# Patient Record
Sex: FEMALE | Race: OTHER | HISPANIC OR LATINO | URBAN - METROPOLITAN AREA
[De-identification: names, ages, dates, MRNs, and addresses within clinical notes are randomized per-mention and may not be internally consistent; named-entity substitution may affect disease eponyms.]

---
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
## 2022-01-19 ENCOUNTER — EMERGENCY (EMERGENCY)
Facility: HOSPITAL | Age: 22
LOS: 1 days | Discharge: HOME | End: 2022-01-19
Attending: EMERGENCY MEDICINE
Payer: MEDICAID

## 2022-01-19 VITALS
DIASTOLIC BLOOD PRESSURE: 66 MMHG | HEIGHT: 62 IN | RESPIRATION RATE: 18 BRPM | OXYGEN SATURATION: 98 % | HEART RATE: 76 BPM | WEIGHT: 119.05 LBS | SYSTOLIC BLOOD PRESSURE: 103 MMHG | TEMPERATURE: 98 F

## 2022-01-19 PROBLEM — Z78.9 OTHER SPECIFIED HEALTH STATUS: Chronic | Status: ACTIVE | Noted: 2020-07-08

## 2022-01-19 LAB
ALBUMIN SERPL ELPH-MCNC: 4.2 G/DL — SIGNIFICANT CHANGE UP (ref 3.5–5)
ALP SERPL-CCNC: 54 U/L — SIGNIFICANT CHANGE UP (ref 40–120)
ALT FLD-CCNC: 21 U/L DA — SIGNIFICANT CHANGE UP (ref 10–60)
ANION GAP SERPL CALC-SCNC: 8 MMOL/L — SIGNIFICANT CHANGE UP (ref 5–17)
APPEARANCE UR: CLEAR — SIGNIFICANT CHANGE UP
AST SERPL-CCNC: 13 U/L — SIGNIFICANT CHANGE UP (ref 10–40)
BASOPHILS # BLD AUTO: 0.05 K/UL — SIGNIFICANT CHANGE UP (ref 0–0.2)
BASOPHILS NFR BLD AUTO: 0.6 % — SIGNIFICANT CHANGE UP (ref 0–2)
BILIRUB SERPL-MCNC: 0.7 MG/DL — SIGNIFICANT CHANGE UP (ref 0.2–1.2)
BILIRUB UR-MCNC: NEGATIVE — SIGNIFICANT CHANGE UP
BUN SERPL-MCNC: 9 MG/DL — SIGNIFICANT CHANGE UP (ref 7–18)
CALCIUM SERPL-MCNC: 9.9 MG/DL — SIGNIFICANT CHANGE UP (ref 8.4–10.5)
CHLORIDE SERPL-SCNC: 103 MMOL/L — SIGNIFICANT CHANGE UP (ref 96–108)
CO2 SERPL-SCNC: 28 MMOL/L — SIGNIFICANT CHANGE UP (ref 22–31)
COLOR SPEC: YELLOW — SIGNIFICANT CHANGE UP
CREAT SERPL-MCNC: 0.63 MG/DL — SIGNIFICANT CHANGE UP (ref 0.5–1.3)
DIFF PNL FLD: NEGATIVE — SIGNIFICANT CHANGE UP
EOSINOPHIL # BLD AUTO: 0.11 K/UL — SIGNIFICANT CHANGE UP (ref 0–0.5)
EOSINOPHIL NFR BLD AUTO: 1.3 % — SIGNIFICANT CHANGE UP (ref 0–6)
GLUCOSE SERPL-MCNC: 83 MG/DL — SIGNIFICANT CHANGE UP (ref 70–99)
GLUCOSE UR QL: NEGATIVE — SIGNIFICANT CHANGE UP
HCG SERPL-ACNC: <1 MIU/ML — SIGNIFICANT CHANGE UP
HCT VFR BLD CALC: 36.4 % — SIGNIFICANT CHANGE UP (ref 34.5–45)
HGB BLD-MCNC: 12.6 G/DL — SIGNIFICANT CHANGE UP (ref 11.5–15.5)
IMM GRANULOCYTES NFR BLD AUTO: 0.2 % — SIGNIFICANT CHANGE UP (ref 0–1.5)
KETONES UR-MCNC: NEGATIVE — SIGNIFICANT CHANGE UP
LEUKOCYTE ESTERASE UR-ACNC: NEGATIVE — SIGNIFICANT CHANGE UP
LIDOCAIN IGE QN: 60 U/L — LOW (ref 73–393)
LYMPHOCYTES # BLD AUTO: 2.35 K/UL — SIGNIFICANT CHANGE UP (ref 1–3.3)
LYMPHOCYTES # BLD AUTO: 27.7 % — SIGNIFICANT CHANGE UP (ref 13–44)
MCHC RBC-ENTMCNC: 29.2 PG — SIGNIFICANT CHANGE UP (ref 27–34)
MCHC RBC-ENTMCNC: 34.6 GM/DL — SIGNIFICANT CHANGE UP (ref 32–36)
MCV RBC AUTO: 84.5 FL — SIGNIFICANT CHANGE UP (ref 80–100)
MONOCYTES # BLD AUTO: 0.62 K/UL — SIGNIFICANT CHANGE UP (ref 0–0.9)
MONOCYTES NFR BLD AUTO: 7.3 % — SIGNIFICANT CHANGE UP (ref 2–14)
NEUTROPHILS # BLD AUTO: 5.32 K/UL — SIGNIFICANT CHANGE UP (ref 1.8–7.4)
NEUTROPHILS NFR BLD AUTO: 62.9 % — SIGNIFICANT CHANGE UP (ref 43–77)
NITRITE UR-MCNC: NEGATIVE — SIGNIFICANT CHANGE UP
NRBC # BLD: 0 /100 WBCS — SIGNIFICANT CHANGE UP (ref 0–0)
PH UR: 5 — SIGNIFICANT CHANGE UP (ref 5–8)
PLATELET # BLD AUTO: 263 K/UL — SIGNIFICANT CHANGE UP (ref 150–400)
POTASSIUM SERPL-MCNC: 3.8 MMOL/L — SIGNIFICANT CHANGE UP (ref 3.5–5.3)
POTASSIUM SERPL-SCNC: 3.8 MMOL/L — SIGNIFICANT CHANGE UP (ref 3.5–5.3)
PROT SERPL-MCNC: 8.3 G/DL — SIGNIFICANT CHANGE UP (ref 6–8.3)
PROT UR-MCNC: 15
RBC # BLD: 4.31 M/UL — SIGNIFICANT CHANGE UP (ref 3.8–5.2)
RBC # FLD: 11.9 % — SIGNIFICANT CHANGE UP (ref 10.3–14.5)
SARS-COV-2 RNA SPEC QL NAA+PROBE: SIGNIFICANT CHANGE UP
SODIUM SERPL-SCNC: 139 MMOL/L — SIGNIFICANT CHANGE UP (ref 135–145)
SP GR SPEC: 1.02 — SIGNIFICANT CHANGE UP (ref 1.01–1.02)
UROBILINOGEN FLD QL: NEGATIVE — SIGNIFICANT CHANGE UP
WBC # BLD: 8.47 K/UL — SIGNIFICANT CHANGE UP (ref 3.8–10.5)
WBC # FLD AUTO: 8.47 K/UL — SIGNIFICANT CHANGE UP (ref 3.8–10.5)

## 2022-01-19 PROCEDURE — 96374 THER/PROPH/DIAG INJ IV PUSH: CPT | Mod: XU

## 2022-01-19 PROCEDURE — 83690 ASSAY OF LIPASE: CPT

## 2022-01-19 PROCEDURE — 99285 EMERGENCY DEPT VISIT HI MDM: CPT

## 2022-01-19 PROCEDURE — 76856 US EXAM PELVIC COMPLETE: CPT | Mod: 26,59

## 2022-01-19 PROCEDURE — 80053 COMPREHEN METABOLIC PANEL: CPT

## 2022-01-19 PROCEDURE — 36415 COLL VENOUS BLD VENIPUNCTURE: CPT

## 2022-01-19 PROCEDURE — 87635 SARS-COV-2 COVID-19 AMP PRB: CPT

## 2022-01-19 PROCEDURE — 76830 TRANSVAGINAL US NON-OB: CPT | Mod: 26

## 2022-01-19 PROCEDURE — 76830 TRANSVAGINAL US NON-OB: CPT

## 2022-01-19 PROCEDURE — 99284 EMERGENCY DEPT VISIT MOD MDM: CPT | Mod: 25

## 2022-01-19 PROCEDURE — 93975 VASCULAR STUDY: CPT

## 2022-01-19 PROCEDURE — 76856 US EXAM PELVIC COMPLETE: CPT

## 2022-01-19 PROCEDURE — 93975 VASCULAR STUDY: CPT | Mod: 26

## 2022-01-19 PROCEDURE — 85025 COMPLETE CBC W/AUTO DIFF WBC: CPT

## 2022-01-19 PROCEDURE — 84702 CHORIONIC GONADOTROPIN TEST: CPT

## 2022-01-19 PROCEDURE — 74177 CT ABD & PELVIS W/CONTRAST: CPT | Mod: MA

## 2022-01-19 PROCEDURE — 74177 CT ABD & PELVIS W/CONTRAST: CPT | Mod: 26,MA

## 2022-01-19 PROCEDURE — 81001 URINALYSIS AUTO W/SCOPE: CPT

## 2022-01-19 RX ORDER — IOHEXOL 300 MG/ML
30 INJECTION, SOLUTION INTRAVENOUS ONCE
Refills: 0 | Status: COMPLETED | OUTPATIENT
Start: 2022-01-19 | End: 2022-01-19

## 2022-01-19 RX ORDER — KETOROLAC TROMETHAMINE 30 MG/ML
15 SYRINGE (ML) INJECTION ONCE
Refills: 0 | Status: DISCONTINUED | OUTPATIENT
Start: 2022-01-19 | End: 2022-01-19

## 2022-01-19 RX ADMIN — IOHEXOL 30 MILLILITER(S): 300 INJECTION, SOLUTION INTRAVENOUS at 19:26

## 2022-01-19 RX ADMIN — Medication 15 MILLIGRAM(S): at 23:06

## 2022-01-19 NOTE — ED PROVIDER NOTE - OBJECTIVE STATEMENT
21 year old female presents to the ED with complaints of 2 days of slow onset of moderate RLQ abdominal pain, associated with anorexia. Patient reports the pain is worse with movement. Denies vomiting, diarrhea, fever, and GI symptoms. Patient states sonogram by GYN told her she had a cyst. States she came to the ER to be evaluated for appendicitis.   NKDA.

## 2022-01-19 NOTE — ED PROVIDER NOTE - CLINICAL SUMMARY MEDICAL DECISION MAKING FREE TEXT BOX
High concern for acute appendicitis. If no appendicitis and abnormal CT scan, pelvic organs may require sonogram. High concern for acute appendicitis. If no appendicitis and abnormal CT scan, pelvic organs may require sonogram.    [place] 0110:  Pt s/o pending U/S results. U/S showing no evidence of torsion or other serious acute path. On reassessment pt states that she is feeling better now with improved pain. Rec OB/GYN and PMD f/u ASAP. Most likely non emergent etiology of symptoms- the details of the case, history, and exam make more emergent diagnoses much less likely. Discussed with pt my clinical impression and results, patient given strict return precautions if persistent or worsening of symptoms occurs, and need for close follow up. Pt expressed understanding and agrees with plan. Pt is well appearing with a reassuring exam. Discharge home with PMD or Specialist f/u within 5 days.

## 2022-01-19 NOTE — ED PROVIDER NOTE - NSFOLLOWUPCLINICS_GEN_ALL_ED_FT
[facility]  [address]  [address] [place] [address]  Phone: [phone]  Fax: [phone]  Follow Up Time: 1-3 Days

## 2022-01-19 NOTE — ED PROVIDER NOTE - NSFOLLOWUPINSTRUCTIONS_ED_ALL_ED_FT
You were seen in the emergency room today for belly pain. Please see the OB/GYN doctors at the next available appointment. Please call your primary doctor to inform them of this ER visit and obtain the next available appointment within the next 5 days. As we discussed, return to the ER if you have any worsening symptoms.    We no longer feel that you need further emergency care or admission to the hospital at this time.    While we have determined that you are currently stable for discharge, we know that things can change. Please seek immediate medical attention or return to the ER if you experience any of the following:  Any worsening or persistent symptoms  Severe Pain  Chest Pain  Difficulty Breathing  Bleeding  Passing Out  Severe Rash  Inability to Eat or Drink  Persistent Fever    Please see a primary care doctor or specialist within 5 days to ensure that you are improving.    Please call the [employer] phone numbers on this document if you have any problems obtaining a follow up appointment.    I wish you well! -Dr Clemons

## 2022-01-19 NOTE — ED PROVIDER NOTE - PATIENT PORTAL LINK FT
You can access the FollowMyHealth Patient Portal offered by [facility] by registering at the following website: [email or web address]. By joining [employer]’s FollowMyHealth portal, you will also be able to view your health information using other applications (apps) compatible with our system.

## 2022-12-08 NOTE — ED PROVIDER NOTE - NS ED MD DISPO DISCHARGE CCDA
[General Appearance - Alert] : alert [General Appearance - In No Acute Distress] : in no acute distress [Sclera] : the sclera and conjunctiva were normal [Outer Ear] : the ears and nose were normal in appearance [Oropharynx] : the oropharynx was normal [Neck Appearance] : the appearance of the neck was normal [Neck Cervical Mass (___cm)] : no neck mass was observed [Jugular Venous Distention Increased] : there was no jugular-venous distention [Thyroid Diffuse Enlargement] : the thyroid was not enlarged [Thyroid Nodule] : there were no palpable thyroid nodules [Auscultation Breath Sounds / Voice Sounds] : lungs were clear to auscultation bilaterally [Heart Rate And Rhythm] : heart rate was normal and rhythm regular [Heart Sounds] : normal S1 and S2 [Heart Sounds Gallop] : no gallops [Murmurs] : no murmurs [Heart Sounds Pericardial Friction Rub] : no pericardial rub [Edema] : there was no peripheral edema [Bowel Sounds] : normal bowel sounds [Abdomen Soft] : soft [Abdomen Tenderness] : non-tender [Abdomen Mass (___ Cm)] : no abdominal mass palpated [Cervical Lymph Nodes Enlarged Posterior Bilaterally] : posterior cervical [Cervical Lymph Nodes Enlarged Anterior Bilaterally] : anterior cervical [Supraclavicular Lymph Nodes Enlarged Bilaterally] : supraclavicular [Skin Color & Pigmentation] : normal skin color and pigmentation [Skin Turgor] : normal skin turgor [] : no rash [No Focal Deficits] : no focal deficits [Oriented To Time, Place, And Person] : oriented to person, place, and time [Impaired Insight] : insight and judgment were intact [Affect] : the affect was normal [FreeTextEntry1] : no active synovitis; (+)tenderness in several B/L MCP's and PIP's;  unable to assess right shoulder due to recent surgery; normal ROM in rest of joints Patient/Caregiver provided printed discharge information.

## 2023-07-18 ENCOUNTER — EMERGENCY (EMERGENCY)
Facility: HOSPITAL | Age: 23
LOS: 1 days | Discharge: HOME | End: 2023-07-18
Admitting: EMERGENCY MEDICINE
Payer: MEDICAID

## 2023-07-18 VITALS
SYSTOLIC BLOOD PRESSURE: 121 MMHG | HEART RATE: 81 BPM | TEMPERATURE: 99 F | OXYGEN SATURATION: 100 % | DIASTOLIC BLOOD PRESSURE: 68 MMHG | RESPIRATION RATE: 16 BRPM

## 2023-07-18 VITALS
SYSTOLIC BLOOD PRESSURE: 128 MMHG | DIASTOLIC BLOOD PRESSURE: 71 MMHG | TEMPERATURE: 98 F | OXYGEN SATURATION: 100 % | HEART RATE: 82 BPM | RESPIRATION RATE: 18 BRPM

## 2023-07-18 LAB
ALBUMIN SERPL ELPH-MCNC: 5.1 G/DL — HIGH (ref 3.3–5)
ALP SERPL-CCNC: 62 U/L — SIGNIFICANT CHANGE UP (ref 40–120)
ALT FLD-CCNC: 11 U/L — SIGNIFICANT CHANGE UP (ref 4–33)
ANION GAP SERPL CALC-SCNC: 16 MMOL/L — HIGH (ref 7–14)
AST SERPL-CCNC: 16 U/L — SIGNIFICANT CHANGE UP (ref 4–32)
BASOPHILS # BLD AUTO: 0.06 K/UL — SIGNIFICANT CHANGE UP (ref 0–0.2)
BASOPHILS NFR BLD AUTO: 0.5 % — SIGNIFICANT CHANGE UP (ref 0–2)
BILIRUB SERPL-MCNC: 0.6 MG/DL — SIGNIFICANT CHANGE UP (ref 0.2–1.2)
BLD GP AB SCN SERPL QL: NEGATIVE — SIGNIFICANT CHANGE UP
BUN SERPL-MCNC: 10 MG/DL — SIGNIFICANT CHANGE UP (ref 7–23)
CALCIUM SERPL-MCNC: 9.9 MG/DL — SIGNIFICANT CHANGE UP (ref 8.4–10.5)
CHLORIDE SERPL-SCNC: 99 MMOL/L — SIGNIFICANT CHANGE UP (ref 98–107)
CO2 SERPL-SCNC: 21 MMOL/L — LOW (ref 22–31)
CREAT SERPL-MCNC: 0.59 MG/DL — SIGNIFICANT CHANGE UP (ref 0.5–1.3)
EGFR: 131 ML/MIN/1.73M2 — SIGNIFICANT CHANGE UP
EOSINOPHIL # BLD AUTO: 0.12 K/UL — SIGNIFICANT CHANGE UP (ref 0–0.5)
EOSINOPHIL NFR BLD AUTO: 1 % — SIGNIFICANT CHANGE UP (ref 0–6)
GLUCOSE SERPL-MCNC: 82 MG/DL — SIGNIFICANT CHANGE UP (ref 70–99)
HCG SERPL-ACNC: 915.9 MIU/ML — SIGNIFICANT CHANGE UP
HCT VFR BLD CALC: 38.9 % — SIGNIFICANT CHANGE UP (ref 34.5–45)
HGB BLD-MCNC: 13.1 G/DL — SIGNIFICANT CHANGE UP (ref 11.5–15.5)
IANC: 8.13 K/UL — HIGH (ref 1.8–7.4)
IMM GRANULOCYTES NFR BLD AUTO: 0.5 % — SIGNIFICANT CHANGE UP (ref 0–0.9)
LYMPHOCYTES # BLD AUTO: 2.36 K/UL — SIGNIFICANT CHANGE UP (ref 1–3.3)
LYMPHOCYTES # BLD AUTO: 20.6 % — SIGNIFICANT CHANGE UP (ref 13–44)
MCHC RBC-ENTMCNC: 29.2 PG — SIGNIFICANT CHANGE UP (ref 27–34)
MCHC RBC-ENTMCNC: 33.7 GM/DL — SIGNIFICANT CHANGE UP (ref 32–36)
MCV RBC AUTO: 86.6 FL — SIGNIFICANT CHANGE UP (ref 80–100)
MONOCYTES # BLD AUTO: 0.75 K/UL — SIGNIFICANT CHANGE UP (ref 0–0.9)
MONOCYTES NFR BLD AUTO: 6.5 % — SIGNIFICANT CHANGE UP (ref 2–14)
NEUTROPHILS # BLD AUTO: 8.13 K/UL — HIGH (ref 1.8–7.4)
NEUTROPHILS NFR BLD AUTO: 70.9 % — SIGNIFICANT CHANGE UP (ref 43–77)
NRBC # BLD: 0 /100 WBCS — SIGNIFICANT CHANGE UP (ref 0–0)
NRBC # FLD: 0 K/UL — SIGNIFICANT CHANGE UP (ref 0–0)
PLATELET # BLD AUTO: 342 K/UL — SIGNIFICANT CHANGE UP (ref 150–400)
POTASSIUM SERPL-MCNC: 3.9 MMOL/L — SIGNIFICANT CHANGE UP (ref 3.5–5.3)
POTASSIUM SERPL-SCNC: 3.9 MMOL/L — SIGNIFICANT CHANGE UP (ref 3.5–5.3)
PROT SERPL-MCNC: 8.3 G/DL — SIGNIFICANT CHANGE UP (ref 6–8.3)
RBC # BLD: 4.49 M/UL — SIGNIFICANT CHANGE UP (ref 3.8–5.2)
RBC # FLD: 12.9 % — SIGNIFICANT CHANGE UP (ref 10.3–14.5)
RH IG SCN BLD-IMP: POSITIVE — SIGNIFICANT CHANGE UP
SODIUM SERPL-SCNC: 136 MMOL/L — SIGNIFICANT CHANGE UP (ref 135–145)
WBC # BLD: 11.48 K/UL — HIGH (ref 3.8–10.5)
WBC # FLD AUTO: 11.48 K/UL — HIGH (ref 3.8–10.5)

## 2023-07-18 PROCEDURE — 99285 EMERGENCY DEPT VISIT HI MDM: CPT

## 2023-07-18 PROCEDURE — 76817 TRANSVAGINAL US OBSTETRIC: CPT | Mod: 26

## 2023-07-18 NOTE — ED PROVIDER NOTE - PATIENT PORTAL LINK FT
You can access the FollowMyHealth Patient Portal offered by [facility] by registering at the following website: [email or web address]. By joining [employer]’s FollowMyHealth portal, you will also be able to view your health information using other applications (apps) compatible with our system.

## 2023-07-18 NOTE — ED PROVIDER NOTE - CLINICAL SUMMARY MEDICAL DECISION MAKING FREE TEXT BOX
23 yo female presenting to ED with complaints of right pelvic pain and scant vaginal bleeding times 4 days. Patient had Positive pregnancy test this morning. LMP 23. Will order T&S, Beta HCG, basic labs, urine and TVUS to RO ectopic vs spontaneous .

## 2023-07-18 NOTE — CONSULT NOTE ADULT - SUBJECTIVE AND OBJECTIVE BOX
ZOË CAMRAENA  22y  Female [id number]    HPI: 21yo  (LMP ~) presenting with abdominal cramping and light vaginal spotting 4 days ago i/s/o positive home pregnancy test. Also reports some nausea. Denies fevers/chills, CP, SOB, dysuria. States she has not had any vaginal spotting since 4 days ago, only noticed it when she was wiping. bHCG today is 915 and TVUS demonstrates intrauterine gestational sac with no yolk sac. GYN consulted for pregnancy of unknown location. Patient states this is a desired pregnancy.    Name of GYN Physician: None    ObHx:  - TOP w/ D&C (  GynHx: h/o ovarian cysts. Denies fibroids, endometriosis, STI's, Abnormal pap smears   PMHx: Denies  SurgHx: D&C  Meds: Denies  Allergies: NKDA  Social History:  Denies smoking use, drug use, alcohol use.    Vital Signs Last 24 Hrs  T(C): 37.1 (2023 18:18), Max: 37.1 (2023 18:18)  T(F): 98.8 (2023 18:18), Max: 98.8 (2023 18:18)  HR: 81 (2023 18:18) (81 - 81)  BP: 121/68 (2023 18:18) (121/68 - 121/68)  RR: 16 (2023 18:18) (16 - 16)  SpO2: 100% (2023 18:18) (100% - 100%)    Parameters below as of 2023 18:18  Patient On (Oxygen Delivery Method): room air    Physical Exam:   General: sitting comfortably in bed, NAD   HEENT: neck supple, full ROM  CV: clinically well perfused  Lungs: unlabored respirations  Back: No CVA tenderness  Abd: Soft, non-tender, non-distended, no rebound or guarding  :  No bleeding on pad. External labia wnl. Bimanual exam with no cervical motion tenderness, uterus wnl, adnexa non palpable b/l.  Cervix closed.  Speculum Exam: No active bleeding from os. Physiologic discharge.    Ext: non-tender b/l, no edema       LABS:  HCG Quantitative, Serum: 915.9                    13.1   11.48 )-----------( 342      ( 2023 19:37 )             38.9     07-18    136  |  99  |  10  ----------------------------<  82  3.9   |  21<L>  |  0.59    Ca    9.9      2023 19:37    TPro  8.3  /  Alb  5.1<H>  /  TBili  0.6  /  DBili  x   /  AST  16  /  ALT  11  /  AlkPhos  62        Urinalysis Basic - ( 2023 19:37 )    Color: x / Appearance: x / SG: x / pH: x  Gluc: 82 mg/dL / Ketone: x  / Bili: x / Urobili: x   Blood: x / Protein: x / Nitrite: x   Leuk Esterase: x / RBC: x / WBC x   Sq Epi: x / Non Sq Epi: x / Bacteria: x    RADIOLOGY & ADDITIONAL STUDIES:  < from: US Transvaginal, OB (23 @ 20:47) >  ACC: [id number] EXAM:  US OB TRANSVAGINAL   ORDERED BY: ARTURO STEVENS     PROCEDURE DATE:  2023          INTERPRETATION:  CLINICAL INFORMATION: Vaginal bleeding. Beta-hC.9    LMP: 2023    Estimated Gestational Age by LMP: 4 weeks 2 days    COMPARISON: Pelvic ultrasound 2022    Endovaginal pelvic sonogram only. Color and Spectral Doppler was   performed.    FINDINGS:  Uterus: 6.9 x 4.0 x 5.6 cm. Endometrial thickness 2.0 cm. Intrauterine   gestational sac.    Gestational Sac Size (mean): 2 mm  Gestational Sac Shape : Normal.  Crown Rump Length: N/A  Estimated Gestational Age: 4 weeks 4 days by mean gestational sac size.  Yolk Sac: N/A  Fetal Heart Rate: N/A    Right ovary: 2.0 cm x 1.8 cm x 1.8 cm. Within normal limits. Corpus   luteal cyst. Normal arterial and venous waveforms.  Left ovary: 2.3 cm x 1.5 cm x 1.1 cm. Within normal limits. Normal   arterial and venous waveforms.    Fluid: None.    IMPRESSION:  Tiny cystic structure identified in the endometrial cavity, which may   represent an early intrauterine gestational sac corresponding to   approximate age of 4 weeks 4 days. No yolk sac or fetal pole present at   this time.    Alternatively, findings may represent pseudogestational sac in the   setting of ectopic pregnancy or early fetal demise; continued follow-up   by gynecologic ultrasound and beta-hCG measurements are recommended.    Adequate vascular flow demonstrated to the bilateral ovaries.    --- End of Report ---          BETTY DICKINSON MD; Resident Radiologist  This document has been electronically signed.  JULISSA RAMIREZ MD; Attending Radiologist  This document has been electronically signed. 2023  9:34PM    < end of copied text >

## 2023-07-18 NOTE — ED PROVIDER NOTE - CHPI ED SYMPTOMS NEG
Follow-up 6 months  Referral to psychiatry  Referral to hematology     no back pain/no discharge/no chills

## 2023-07-18 NOTE — ED PROVIDER NOTE - OBJECTIVE STATEMENT
21 yo female  1010, presenting to ED with complaints of vaginal spotting for 3 days, positive pregnancy test this morning, LMP was 23. Patient States nausea and RL pelvic pain. Blood noted when wiping. Denies fall, recent sexual activity (1 week ago) or illness.

## 2023-07-18 NOTE — CONSULT NOTE ADULT - ASSESSMENT
21yo  (LMP ~6/20) presenting with abdominal cramping and light vaginal spotting 4 days ago i/s/o positive home pregnancy test. States abdominal pain today is mild and denies vaginal bleeding. bHCG today is 915 and TVUS demonstrates intrauterine gestational sac with no yolk sac. GYN consulted for pregnancy of unknown location. Patient states this is a desired pregnancy. In the ED, vitals WNL, abdominal and pelvic exam wnl, H/H 13.1/38.9. Patient is hemodynamically stable.  - Patient to follow up in 48 hours for repeat b-HCG in clinic vs. the ED  - Rh type: O+.    No need for rhogam at this time.   - Ectopic precautions reviewed with patient.  Discussed with patient importance of follow up for b-HCG given unknown pregnancy location at this time.  Patient expressed understanding.  All questions and concerns addressed to patient's apparent satisfaction.   - Patient to be added to GYN b-HCG list for closer follow up.    - Patient may call the clinic to follow up for GYN care if she wishes:       [facility]       Oncology [facility]       [address] [place] [address]       [phone]  - Patient stable for d/c home from GYN perspective. Primary management per ED team.      d/w Dr. Terry Augustin, PGY-2   23yo  (LMP ~6) presenting with abdominal cramping and light vaginal spotting 4 days ago i/s/o positive home pregnancy test. States abdominal pain today is mild and denies vaginal bleeding. bHCG today is 915 and TVUS demonstrates intrauterine gestational sac with no yolk sac. GYN consulted for pregnancy of unknown location. Patient states this is a desired pregnancy. In the ED, vitals WNL, abdominal and pelvic exam wnl, H/H 13.1/38.9. Patient is hemodynamically stable.  - Patient to follow up in 48 hours for repeat b-HCG in clinic vs. the ED  - Rh type: O+.    No need for rhogam at this time.   - Ectopic precautions reviewed with patient.  Discussed with patient importance of follow up for b-HCG given unknown pregnancy location at this time.  Patient expressed understanding.  All questions and concerns addressed to patient's apparent satisfaction.   - Patient to be added to GYN b-HCG list for closer follow up.    - Patient may call the clinic to follow up for GYN care if she wishes:       [facility]       Oncology Building, [place] [address]       [phone]  - Patient stable for d/c home from GYN perspective. Primary management per ED team.      d/w Dr. Stewart    23y/o  LMP ~ with PUL, clinically stable, to f/u in 48 hours.  Abimael Stewart M.D.  Venancio Augustin, PGY-2

## 2023-07-18 NOTE — ED ADULT NURSE NOTE - OBJECTIVE STATEMENT
pt received to [facility] with c/o vaginal spotting and R sided pelvic pain. states she had a + preg test at home today. pt . pt aox4, ambulatory without assistance. denies n/v/d and dysuria. LMP 6/10. IV placed, labs drawn and sent. boyfriend at bedside. pt pending U/S.

## 2023-07-18 NOTE — ED PROVIDER NOTE - NSFOLLOWUPCLINICS_GEN_ALL_ED_FT
[facility]  OB/GYN & Surg  [phone] [place] [address]  Phone: [phone]  Fax:

## 2023-07-20 ENCOUNTER — EMERGENCY (EMERGENCY)
Facility: HOSPITAL | Age: 23
LOS: 1 days | Discharge: HOME | End: 2023-07-20
Attending: STUDENT IN AN ORGANIZED HEALTH CARE EDUCATION/TRAINING PROGRAM | Admitting: STUDENT IN AN ORGANIZED HEALTH CARE EDUCATION/TRAINING PROGRAM
Payer: SELF-PAY

## 2023-07-20 VITALS
DIASTOLIC BLOOD PRESSURE: 66 MMHG | RESPIRATION RATE: 16 BRPM | OXYGEN SATURATION: 100 % | SYSTOLIC BLOOD PRESSURE: 115 MMHG | HEART RATE: 86 BPM | TEMPERATURE: 98 F

## 2023-07-20 VITALS
HEART RATE: 85 BPM | DIASTOLIC BLOOD PRESSURE: 57 MMHG | SYSTOLIC BLOOD PRESSURE: 100 MMHG | OXYGEN SATURATION: 100 % | TEMPERATURE: 99 F | RESPIRATION RATE: 15 BRPM

## 2023-07-20 LAB
ALBUMIN SERPL ELPH-MCNC: 4.8 G/DL — SIGNIFICANT CHANGE UP (ref 3.3–5)
ALP SERPL-CCNC: 61 U/L — SIGNIFICANT CHANGE UP (ref 40–120)
ALT FLD-CCNC: 11 U/L — SIGNIFICANT CHANGE UP (ref 4–33)
ANION GAP SERPL CALC-SCNC: 11 MMOL/L — SIGNIFICANT CHANGE UP (ref 7–14)
APPEARANCE UR: CLEAR — SIGNIFICANT CHANGE UP
APTT BLD: 34.1 SEC — SIGNIFICANT CHANGE UP (ref 27–36.3)
AST SERPL-CCNC: 13 U/L — SIGNIFICANT CHANGE UP (ref 4–32)
BACTERIA # UR AUTO: ABNORMAL /HPF
BASOPHILS # BLD AUTO: 0.04 K/UL — SIGNIFICANT CHANGE UP (ref 0–0.2)
BASOPHILS NFR BLD AUTO: 0.4 % — SIGNIFICANT CHANGE UP (ref 0–2)
BILIRUB SERPL-MCNC: 0.6 MG/DL — SIGNIFICANT CHANGE UP (ref 0.2–1.2)
BILIRUB UR-MCNC: NEGATIVE — SIGNIFICANT CHANGE UP
BLD GP AB SCN SERPL QL: NEGATIVE — SIGNIFICANT CHANGE UP
BUN SERPL-MCNC: 12 MG/DL — SIGNIFICANT CHANGE UP (ref 7–23)
CALCIUM SERPL-MCNC: 9.8 MG/DL — SIGNIFICANT CHANGE UP (ref 8.4–10.5)
CAST: 1 /LPF — SIGNIFICANT CHANGE UP (ref 0–4)
CHLORIDE SERPL-SCNC: 101 MMOL/L — SIGNIFICANT CHANGE UP (ref 98–107)
CO2 SERPL-SCNC: 24 MMOL/L — SIGNIFICANT CHANGE UP (ref 22–31)
COLOR SPEC: YELLOW — SIGNIFICANT CHANGE UP
CREAT SERPL-MCNC: 0.56 MG/DL — SIGNIFICANT CHANGE UP (ref 0.5–1.3)
DIFF PNL FLD: NEGATIVE — SIGNIFICANT CHANGE UP
EGFR: 133 ML/MIN/1.73M2 — SIGNIFICANT CHANGE UP
EOSINOPHIL # BLD AUTO: 0.14 K/UL — SIGNIFICANT CHANGE UP (ref 0–0.5)
EOSINOPHIL NFR BLD AUTO: 1.5 % — SIGNIFICANT CHANGE UP (ref 0–6)
GLUCOSE SERPL-MCNC: 88 MG/DL — SIGNIFICANT CHANGE UP (ref 70–99)
GLUCOSE UR QL: NEGATIVE MG/DL — SIGNIFICANT CHANGE UP
HCG SERPL-ACNC: 1560 MIU/ML — SIGNIFICANT CHANGE UP
HCT VFR BLD CALC: 38.7 % — SIGNIFICANT CHANGE UP (ref 34.5–45)
HGB BLD-MCNC: 12.9 G/DL — SIGNIFICANT CHANGE UP (ref 11.5–15.5)
IANC: 6.2 K/UL — SIGNIFICANT CHANGE UP (ref 1.8–7.4)
IMM GRANULOCYTES NFR BLD AUTO: 0.3 % — SIGNIFICANT CHANGE UP (ref 0–0.9)
INR BLD: 1.13 RATIO — SIGNIFICANT CHANGE UP (ref 0.88–1.16)
KETONES UR-MCNC: NEGATIVE MG/DL — SIGNIFICANT CHANGE UP
LEUKOCYTE ESTERASE UR-ACNC: NEGATIVE — SIGNIFICANT CHANGE UP
LYMPHOCYTES # BLD AUTO: 2.06 K/UL — SIGNIFICANT CHANGE UP (ref 1–3.3)
LYMPHOCYTES # BLD AUTO: 22.5 % — SIGNIFICANT CHANGE UP (ref 13–44)
MCHC RBC-ENTMCNC: 28.9 PG — SIGNIFICANT CHANGE UP (ref 27–34)
MCHC RBC-ENTMCNC: 33.3 GM/DL — SIGNIFICANT CHANGE UP (ref 32–36)
MCV RBC AUTO: 86.8 FL — SIGNIFICANT CHANGE UP (ref 80–100)
MONOCYTES # BLD AUTO: 0.69 K/UL — SIGNIFICANT CHANGE UP (ref 0–0.9)
MONOCYTES NFR BLD AUTO: 7.5 % — SIGNIFICANT CHANGE UP (ref 2–14)
NEUTROPHILS # BLD AUTO: 6.2 K/UL — SIGNIFICANT CHANGE UP (ref 1.8–7.4)
NEUTROPHILS NFR BLD AUTO: 67.8 % — SIGNIFICANT CHANGE UP (ref 43–77)
NITRITE UR-MCNC: NEGATIVE — SIGNIFICANT CHANGE UP
NRBC # BLD: 0 /100 WBCS — SIGNIFICANT CHANGE UP (ref 0–0)
NRBC # FLD: 0 K/UL — SIGNIFICANT CHANGE UP (ref 0–0)
PH UR: 6 — SIGNIFICANT CHANGE UP (ref 5–8)
PLATELET # BLD AUTO: 318 K/UL — SIGNIFICANT CHANGE UP (ref 150–400)
POTASSIUM SERPL-MCNC: 4.2 MMOL/L — SIGNIFICANT CHANGE UP (ref 3.5–5.3)
POTASSIUM SERPL-SCNC: 4.2 MMOL/L — SIGNIFICANT CHANGE UP (ref 3.5–5.3)
PROT SERPL-MCNC: 8 G/DL — SIGNIFICANT CHANGE UP (ref 6–8.3)
PROT UR-MCNC: NEGATIVE MG/DL — SIGNIFICANT CHANGE UP
PROTHROM AB SERPL-ACNC: 13.1 SEC — SIGNIFICANT CHANGE UP (ref 10.5–13.4)
RBC # BLD: 4.46 M/UL — SIGNIFICANT CHANGE UP (ref 3.8–5.2)
RBC # FLD: 12.9 % — SIGNIFICANT CHANGE UP (ref 10.3–14.5)
RBC CASTS # UR COMP ASSIST: 5 /HPF — HIGH (ref 0–4)
REVIEW: SIGNIFICANT CHANGE UP
RH IG SCN BLD-IMP: POSITIVE — SIGNIFICANT CHANGE UP
SODIUM SERPL-SCNC: 136 MMOL/L — SIGNIFICANT CHANGE UP (ref 135–145)
SP GR SPEC: 1.01 — SIGNIFICANT CHANGE UP (ref 1–1.03)
SQUAMOUS # UR AUTO: 7 /HPF — HIGH (ref 0–5)
UROBILINOGEN FLD QL: 0.2 MG/DL — SIGNIFICANT CHANGE UP (ref 0.2–1)
WBC # BLD: 9.16 K/UL — SIGNIFICANT CHANGE UP (ref 3.8–10.5)
WBC # FLD AUTO: 9.16 K/UL — SIGNIFICANT CHANGE UP (ref 3.8–10.5)
WBC UR QL: 3 /HPF — SIGNIFICANT CHANGE UP (ref 0–5)

## 2023-07-20 PROCEDURE — 76830 TRANSVAGINAL US NON-OB: CPT | Mod: 26

## 2023-07-20 PROCEDURE — 99285 EMERGENCY DEPT VISIT HI MDM: CPT

## 2023-07-20 RX ORDER — ACETAMINOPHEN 500 MG
975 TABLET ORAL ONCE
Refills: 0 | Status: COMPLETED | OUTPATIENT
Start: 2023-07-20 | End: 2023-07-20

## 2023-07-20 RX ADMIN — Medication 975 MILLIGRAM(S): at 12:14

## 2023-07-20 NOTE — ED ADULT NURSE NOTE - OBJECTIVE STATEMENT
patient Alert & ox3,Pt is approximately X 4 weeks pregnant, c/o RLQ pain and cramping. Had light vaginal bleeding X 1 week ago.  here for 48 hour followup repeat blood work and imaging.pt . evaluated by MD.Placed 20g angiocath left AC., labs drawn and sent. Due meds given as per order.patient will be waiting for results, further evaluation and disposition.  made comfortable.will continue to monitor.

## 2023-07-20 NOTE — ED PROVIDER NOTE - PHYSICAL EXAMINATION
Gen: WDWN, NAD  HEENT:  mucous membranes moist  CV: RRR, +S1/S2, no M/R/G, 2+ radial pulses b/l  Resp: CTAB, no W/R/R, no accessory muscle use, no increased work of breathing  GI: Abdomen soft non-distended, +RLQ tenderness to palpation.   MSK: no LE edema  Neuro: A&Ox4, following commands, moving all four extremities spontaneously  Psych: appropriate mood

## 2023-07-20 NOTE — ED PROVIDER NOTE - PROGRESS NOTE DETAILS
Flavio Bernal MD, PGY2  hCG nearly doubled, transvaginal ultrasound showing likely intrauterine pregnancy, no ovarian torsion.  Discussed with OB/GYN who will have patient follow-up with them in clinic in 1 week.  Discussed possibility of appendicitis with patient, patient states she is feeling better with Tylenol.  Offered CDU for MRI to evaluate for appendicitis.  Patient would prefer to go home and return to emergency department if symptoms worsen.  Patient very well-appearing, tolerating p.o., not endorsing pain in right lower quadrant currently after Tylenol.  Will discharge home.  Patient in agreement with plan.  Answered all questions and gave return precautions.

## 2023-07-20 NOTE — CONSULT NOTE ADULT - ASSESSMENT
Clinical picture most consistent with early intrauterine pregnancy but due to lack of yolk sac or fetal pole cannot completely exclude ectopic pregnancy at this time. Differential diagnosis at this time includes early IUP vs SAB vs ectopic pregnancy. No clinical or radiologic findings concerning for ruptured ectopic pregnancy.    Recs:   - Given patient has pregnancy of unknown location, no elevated concern for ectopic pregnancy, patient counseled on expectant management with serial bHCG and ultrasound as needed  - Patient counseled on importance of following up in setting of pregnancy of unknown location where ectopic cannot be ruled out, patient voices her understanding   - Patient to follow up in [facility] GYN clinic in one week for repeat bHCG   - Email sent to facilitate clinic appointment, please provide patient with contact information: [facility] [phone]  - Patient is Rh positive, no rhogam indicated  - Strict return precautions discussed, patient to return to ED for heavy bleeding (soaking >1 pad in 1 hr), severe pain, dizziness/lightheadedness, fevers, chills, CP/SOB    BEATRIZ Tolentino  Examined w/ Dr. Mendoza 21yo  (LMP ~) presenting for repeat bHCG. Patient originally presented on  with abdominal cramping and light vaginal spotting i/s/o positive home pregnancy test. Patient was advised to return 48 hours later to have repeat bHCG done as her TVUS showed a gestational sac with no yolk sac or fetal pole. Today pt's bHCG 1560 with TVUS showing intrauterine sac likestructure, favored to represent an early intrauterine gestational sac of 4 weeks 6 days. No evidence of adnexal mass. Right ovarian corpus luteum cyst. Clinical picture most consistent with early intrauterine pregnancy but due to lack of yolk sac or fetal pole cannot completely exclude psuedo gestational sac with ectopic pregnancy at this time. Patients physical exam benign and vital signs within normal limits. Differential diagnosis at this time includes early IUP vs SAB vs ectopic pregnancy. No clinical or radiologic findings concerning for ruptured ectopic pregnancy.    Recs:   - Given patient has pregnancy of unknown location, no elevated concern for ectopic pregnancy, patient counseled on expectant management with serial bHCG and ultrasound as needed  - Patient counseled on importance of following up in setting of pregnancy of unknown location where ectopic cannot be ruled out, patient voices her understanding   - Patient to follow up in [facility] GYN clinic in one week for repeat bHCG   - Email sent to facilitate clinic appointment, please provide patient with contact information: [facility] [phone]  - Patient is Rh positive, no rhogam indicated  - Strict return precautions discussed, patient to return to ED for heavy bleeding (soaking >1 pad in 1 hr), severe pain, dizziness/lightheadedness, fevers, chills, CP/SOB    BEATRIZ Tolentino  Examined w/ Dr. Mendoza

## 2023-07-20 NOTE — ED PROVIDER NOTE - NSFOLLOWUPINSTRUCTIONS_ED_ALL_ED_FT
Please follow up with the OBGYN clinic in 1 week for follow up lab testing. They should be contacting you to help set up the appointment. This is the number for the clinic [phone].    Abdominal Pain    Many things can cause abdominal pain. Many times, abdominal pain is not caused by a disease and will improve without treatment. Your health care provider will do a physical exam to determine if there is a dangerous cause of your pain; blood tests and imaging may help determine the cause of your pain. However, in many cases, no cause may be found and you may need further testing as an outpatient. Monitor your abdominal pain for any changes.     SEEK IMMEDIATE MEDICAL CARE IF YOU HAVE ANY OF THE FOLLOWING SYMPTOMS: worsening abdominal pain, uncontrollable vomiting, profuse diarrhea, inability to have bowel movements or pass gas, black or bloody stools, fever accompanying chest pain or back pain, or fainting. These symptoms may represent a serious problem that is an emergency. Do not wait to see if the symptoms will go away. Get medical help right away. Call 911 and do not drive yourself to the hospital.

## 2023-07-20 NOTE — CONSULT NOTE ADULT - SUBJECTIVE AND OBJECTIVE BOX
ZOË CAMARENA  22y  Female [id number]    HPI:        Name of GYN Physician: Plans to follow up with [facility]  OBHx:  x1 TOP  GynHx: Denies fibroids, cysts, endometriosis, STI's, Abnormal pap smears   PMH: Denies   PSH: Denies  Meds: Denies  Allx: NKDA  Social History:  Denies smoking use, drug use, alcohol use.    Vital Signs Last 24 Hrs  T(C): 36.7 (20 Jul 2023 10:38), Max: 36.7 (20 Jul 2023 10:38)  T(F): 98 (20 Jul 2023 10:38), Max: 98 (20 Jul 2023 10:38)  HR: 86 (20 Jul 2023 10:38) (86 - 86)  BP: 115/66 (20 Jul 2023 10:38) (115/66 - 115/66)  BP(mean): --  RR: 16 (20 Jul 2023 10:38) (16 - 16)  SpO2: 100% (20 Jul 2023 10:38) (100% - 100%)    Parameters below as of 20 Jul 2023 10:38  Patient On (Oxygen Delivery Method): room air        Physical Exam:   General: sitting comfortably in bed, NAD   HEENT: neck supple, full ROM  CV: RRR  Lungs: CTA b/l, good air flow b/l   Back: No CVA tenderness  Abd: Soft, non-distended. Tender to deep palpation in RLQ.  Bowel sounds present.    :  No bleeding on pad.    Speculum Exam: Deferred      LABS:                        12.9   9.16  )-----------( 318      ( 20 Jul 2023 12:21 )             38.7     07-20    136  |  101  |  12  ----------------------------<  88  4.2   |  24  |  0.56    Ca    9.8      20 Jul 2023 12:21    TPro  8.0  /  Alb  4.8  /  TBili  0.6  /  DBili  x   /  AST  13  /  ALT  11  /  AlkPhos  61  07-20    I&O's Detail    PT/INR - ( 20 Jul 2023 12:21 )   PT: 13.1 sec;   INR: 1.13 ratio         PTT - ( 20 Jul 2023 12:21 )  PTT:34.1 sec  Urinalysis Basic - ( 20 Jul 2023 12:21 )    Color: x / Appearance: x / SG: x / pH: x  Gluc: 88 mg/dL / Ketone: x  / Bili: x / Urobili: x   Blood: x / Protein: x / Nitrite: x   Leuk Esterase: x / RBC: x / WBC x   Sq Epi: x / Non Sq Epi: x / Bacteria: x        RADIOLOGY & ADDITIONAL STUDIES:    < from: US Transvaginal (07.20.23 @ 13:17) >  ACC: [id number] EXAM:  US TRANSVAGINAL   ORDERED BY: NAT MARTE     PROCEDURE DATE:  07/20/2023          INTERPRETATION:  CLINICAL INFORMATION: Right lower quadrant pain. Beta   hCG 1560, previously 916 on July 18.    LMP: June 28, 2023    COMPARISON: July 18, 2023.    TECHNIQUE:  Transabdominal pelvic sonogram only.    FINDINGS:  Uterus: 7.9 x 3.3 x 5.8 cm. Within normal limits.  Endometrium: . There is decidual thickening of the endometrial lining   with a 4 x 3 x 4 mm intrauterine saclike structure. No fetal pole or yolk   sac is identified.    Right ovary: 2.6 x 2.1 x 2.7 cm and contains a 1.5 x 1.0 x 1.4 cm corpus   luteum cyst.  Left ovary: 2.3 x 1.4 x 1.7 cm. Within normal limits.    Fluid: None.    IMPRESSION:  Intrauterine sac likestructure, favored to represent an early   intrauterine gestational sac of 4 weeks 6 days. No evidence of adnexal   mass. Right ovarian corpus luteum cyst. Correlate with serum beta-hCG and   follow-up ultrasound.    --- End of Report ---        NAT BOSS MD; Attending Radiologist  This document has been electronically signed. Jul 20 2023  1:26PM    < end of copied text >   ZOË CAMARENA  22y  Female [id number]    HPI: 21yo  (LMP ~) presenting for repeat bHCG. Patient originally presented on  with abdominal cramping and light vaginal spotting i/s/o positive home pregnancy test. Patient was advised to return 48 hours later to have repeat bHCG done as her TVUS showed a gestational sac with no yolk sac or fetal pole. Patient states since she was seen she has had a consistent RLQ pain but denies nausea, vomiting, changes in appetite. She states she has been constipated for the past week. She denies current vaginal bleeding, abnormal vaginal discharge, chest pain, SOB, headache, dizziness. This is a desired pregnancy.       Name of GYN Physician: Plans to follow with [facility]  ObHx:  - TOP w/ D&C (  GynHx: h/o ovarian cysts. Denies fibroids, endometriosis, STI's, Abnormal pap smears   PMHx: Denies  SurgHx: D&C  Meds: Denies  Allergies: NKDA  Social History:  Denies smoking use, drug use, alcohol use.    Vital Signs Last 24 Hrs  T(C): 36.7 (2023 10:38), Max: 36.7 (2023 10:38)  T(F): 98 (2023 10:38), Max: 98 (2023 10:38)  HR: 86 (2023 10:38) (86 - 86)  BP: 115/66 (2023 10:38) (115/66 - 115/66)  BP(mean): --  RR: 16 (2023 10:38) (16 - 16)  SpO2: 100% (2023 10:38) (100% - 100%)    Parameters below as of 2023 10:38  Patient On (Oxygen Delivery Method): room air        Physical Exam:   General: sitting comfortably in bed, NAD   HEENT: neck supple, full ROM  CV: RRR  Lungs: CTA b/l, good air flow b/l   Back: No CVA tenderness  Abd: Soft, non-distended. Tender to deep palpation in RLQ.  Bowel sounds present.    :  No bleeding on pad.    Speculum Exam: Deferred      LABS:                        12.9   9.16  )-----------( 318      ( 2023 12:21 )             38.7         136  |  101  |  12  ----------------------------<  88  4.2   |  24  |  0.56    Ca    9.8      2023 12:21    TPro  8.0  /  Alb  4.8  /  TBili  0.6  /  DBili  x   /  AST  13  /  ALT  11  /  AlkPhos  61  20    I&O's Detail    PT/INR - ( 2023 12:21 )   PT: 13.1 sec;   INR: 1.13 ratio         PTT - ( 2023 12:21 )  PTT:34.1 sec  Urinalysis Basic - ( 2023 12:21 )    Color: x / Appearance: x / SG: x / pH: x  Gluc: 88 mg/dL / Ketone: x  / Bili: x / Urobili: x   Blood: x / Protein: x / Nitrite: x   Leuk Esterase: x / RBC: x / WBC x   Sq Epi: x / Non Sq Epi: x / Bacteria: x        RADIOLOGY & ADDITIONAL STUDIES:    < from: US Transvaginal (23 @ 13:17) >  ACC: [id number] EXAM:  US TRANSVAGINAL   ORDERED BY: NAT MARTE     PROCEDURE DATE:  2023          INTERPRETATION:  CLINICAL INFORMATION: Right lower quadrant pain. Beta   hCG 1560, previously 916 on .    LMP: 2023    COMPARISON: 2023.    TECHNIQUE:  Transabdominal pelvic sonogram only.    FINDINGS:  Uterus: 7.9 x 3.3 x 5.8 cm. Within normal limits.  Endometrium: . There is decidual thickening of the endometrial lining   with a 4 x 3 x 4 mm intrauterine saclike structure. No fetal pole or yolk   sac is identified.    Right ovary: 2.6 x 2.1 x 2.7 cm and contains a 1.5 x 1.0 x 1.4 cm corpus   luteum cyst.  Left ovary: 2.3 x 1.4 x 1.7 cm. Within normal limits.    Fluid: None.    IMPRESSION:  Intrauterine sac likestructure, favored to represent an early   intrauterine gestational sac of 4 weeks 6 days. No evidence of adnexal   mass. Right ovarian corpus luteum cyst. Correlate with serum beta-hCG and   follow-up ultrasound.    --- End of Report ---        NAT BOSS MD; Attending Radiologist  This document has been electronically signed. 2023  1:26PM    < end of copied text >

## 2023-07-20 NOTE — ED PROVIDER NOTE - PATIENT PORTAL LINK FT
You can access the FollowMyHealth Patient Portal offered by [facility] by registering at the following website: [email or web address]. By joining [employer]’s FollowMyHealth portal, you will also be able to view your health information using other applications (apps) compatible with our system.

## 2023-07-20 NOTE — ED PROVIDER NOTE - CLINICAL SUMMARY MEDICAL DECISION MAKING FREE TEXT BOX
Flavio Bernal MD, PGY2  22-year-old  LMP 6–28 presenting to emergency department with right lower quadrant abdominal tenderness x1 week.  Patient was seen in the emergency department 2 days prior with same symptoms and had transvaginal ultrasound showing tiny cystic structure in the endometrial cavity which may represent early intrauterine gestational sac versus pseudo gestational sac in setting of ectopic pregnancy or early fetal demise.  Patient was initially having vaginal bleeding when presented to emergency department 2 days ago however not currently endorsing any vaginal bleeding.  Still having pain in right lower quadrant of the abdomen.  Also associated nausea.  Denies fever, headache, episodes of emesis, chest pain, shortness of breath, dysuria, hematuria, rash, extremity edema.  Last hCG on  was 915.  OBGYN: Ruthann Vyas    Vital signs stable in the emergency department.  On exam patient has right lower quadrant tenderness to palpation.  Not currently endorsing any vaginal bleeding.  Has not taken any medications for symptoms thus far.  Differential including but not limited to early intrauterine gestation, ovarian torsion, ectopic pregnancy, appendicitis.  Discussed with OB/GYN who recommended repeating labs and imaging and will come evaluate patient.  Will give Tylenol for pain.  Will reassess. Flavio Bernal MD, PGY2  22-year-old  LMP 6–28 presenting to emergency department with right lower quadrant abdominal tenderness x1 week.  Patient was seen in the emergency department 2 days prior with same symptoms and had transvaginal ultrasound showing tiny cystic structure in the endometrial cavity which may represent early intrauterine gestational sac versus pseudo gestational sac in setting of ectopic pregnancy or early fetal demise.  Patient was initially having vaginal bleeding when presented to emergency department 2 days ago however not currently endorsing any vaginal bleeding.  Still having pain in right lower quadrant of the abdomen.  Also associated nausea.  Denies fever, headache, episodes of emesis, chest pain, shortness of breath, dysuria, hematuria, rash, extremity edema.  Last hCG on  was 915.  OBGYN: Ruthann Vyas    Vital signs stable in the emergency department.  On exam patient has right lower quadrant tenderness to palpation.  Not currently endorsing any vaginal bleeding.  Has not taken any medications for symptoms thus far.  Differential including but not limited to early intrauterine gestation, ovarian torsion, ectopic pregnancy, appendicitis.  Discussed with OB/GYN who recommended repeating labs and imaging and will come evaluate patient.  Will give Tylenol for pain.  Will reassess.    Gwyn Bridges  See Attestation

## 2023-07-20 NOTE — ED ADULT TRIAGE NOTE - CHIEF COMPLAINT QUOTE
Pt is approximately X 4 weeks pregnant, c/o RLQ pain and cramping. Had light vaginal bleeding X 1 week ago. Pt is . Pt was here X 2 days ago, here for 48 hour followup repeat blood work and imaging. Denies any medical Hx.

## 2023-07-20 NOTE — ED PROVIDER NOTE - ATTENDING CONTRIBUTION TO CARE
I performed a history and physical exam of the patient and discussed their management with the resident/fellow/ACP/student. I have reviewed the resident/fellow/ACP/student note and agree with the documented findings and plan of care, except as noted. I have personally performed a substantive portion of the visit including all aspects of the medical decision making. My medical decision making and observations are found above. Please refer to any progress notes for updates on clinical course.     21 yo female  1010 presenting for right pelvic pain.  Patient was in the ER 2 days ago, with positive hCG level and low 900s and transvaginal ultrasound unable to identify IUP.  Prior to patient's presentation had mild vaginal bleeding with associated right pelvic pain. Returns today for follow-up hCG +/- repeat transvaginal ultrasound and OB/GYN follow-up given concern for ectopic pregnancy.  Reports mild nausea with no associated fever/chills, vomiting/diarrhea, cough, rashes, dysuria, hematuria, vaginal discharge, or foul odors. No vaginal bleeding at this time.    Gen: WDWN, NAD, comfortable appearing, afebrile, hemodynamically stable   HEENT: No nasal discharge, mucous membranes moist  CV: RRR, +S1/S2, no M/R/G, equal b/l radial pulses 2+  Resp: CTAB, no W/R/R, SPO2 >95% on RA, no increased WOB   GI: Abdomen soft non-distended, right pelvic/adnexal TTP with no abdominal TTP, no masses/organomegaly   MSK/Skin: No CVA tenderness, no open wounds, no bruising, no LE edema  Neuro: A&Ox4, moving all 4 extremities spontaneously, gross sensation intact in UE and LE BL  Psych: appropriate mood     MDM:   21 yo female  1010 presenting for r/o ectopic pregnancy in setting of right pelvic pain and +serum hCG 2 days ago in ER with TVUS unable to identify IUP, persistent right pelvic pain, sharp in nature, no vaginal bleeding since symptom onset, no infectious symptoms, afebrile, hemodynamically stable. Will consult OB/GYN, obtain basic labs, coags/type screen, rpt TVUS, pain control and reassess. Ovarian torsion and appendicitis also considered; rpt TVUS will assess for torsion. Exam/hx more suspicious for ectopic vs. appendicitis (no infectious symptoms, right pelvic TTP, +Hcg, afebrile); will obtain TVUS for ectopic r/o and monitor symptoms/changes in clinical course/labs; no CTAP at this time

## 2023-07-20 NOTE — CONSULT NOTE ADULT - NS ATTEND AMEND GEN_ALL_CORE FT
Reviewed images and appears to be intrauterine gestational sac. REcommend repeating sonogram in 1 week to confirm viability.  Spoke to patient and aware of plan

## 2023-07-21 LAB
CULTURE RESULTS: SIGNIFICANT CHANGE UP
SPECIMEN SOURCE: SIGNIFICANT CHANGE UP

## 2023-07-26 ENCOUNTER — RESULT REVIEW (OUTPATIENT)
Age: 23
End: 2023-07-26

## 2023-07-26 ENCOUNTER — OUTPATIENT (OUTPATIENT)
Dept: OUTPATIENT SERVICES | Facility: HOSPITAL | Age: 23
LOS: 1 days | End: 2023-07-26

## 2023-07-26 ENCOUNTER — APPOINTMENT (OUTPATIENT)
Dept: OBGYN | Facility: HOSPITAL | Age: 23
End: 2023-07-26
Payer: MEDICAID

## 2023-07-26 VITALS
BODY MASS INDEX: 21.44 KG/M2 | WEIGHT: 121 LBS | SYSTOLIC BLOOD PRESSURE: 94 MMHG | HEART RATE: 68 BPM | TEMPERATURE: 98.4 F | HEIGHT: 63 IN | DIASTOLIC BLOOD PRESSURE: 47 MMHG

## 2023-07-26 DIAGNOSIS — Z00.00 ENCOUNTER FOR GENERAL ADULT MEDICAL EXAMINATION W/OUT ABNORMAL FINDINGS: ICD-10-CM

## 2023-07-26 DIAGNOSIS — N39.0 URINARY TRACT INFECTION, SITE NOT SPECIFIED: ICD-10-CM

## 2023-07-26 DIAGNOSIS — O36.80X0 PREGNANCY WITH INCONCLUSIVE FETAL VIABILITY, NOT APPLICABLE OR UNSPECIFIED: ICD-10-CM

## 2023-07-26 DIAGNOSIS — Z33.2 ENCOUNTER FOR ELECTIVE TERMINATION OF PREGNANCY: ICD-10-CM

## 2023-07-26 LAB
APPEARANCE UR: CLEAR — SIGNIFICANT CHANGE UP
BACTERIA # UR AUTO: NEGATIVE /HPF — SIGNIFICANT CHANGE UP
BILIRUB UR-MCNC: NEGATIVE — SIGNIFICANT CHANGE UP
CAST: 2 /LPF — SIGNIFICANT CHANGE UP (ref 0–4)
COLOR SPEC: YELLOW — SIGNIFICANT CHANGE UP
DIFF PNL FLD: NEGATIVE — SIGNIFICANT CHANGE UP
GLUCOSE UR QL: NEGATIVE MG/DL — SIGNIFICANT CHANGE UP
HCG SERPL-ACNC: SIGNIFICANT CHANGE UP MIU/ML
KETONES UR-MCNC: ABNORMAL MG/DL
LEUKOCYTE ESTERASE UR-ACNC: NEGATIVE — SIGNIFICANT CHANGE UP
NITRITE UR-MCNC: NEGATIVE — SIGNIFICANT CHANGE UP
PH UR: 5.5 — SIGNIFICANT CHANGE UP (ref 5–8)
PROT UR-MCNC: NEGATIVE MG/DL — SIGNIFICANT CHANGE UP
RBC CASTS # UR COMP ASSIST: 0 /HPF — SIGNIFICANT CHANGE UP (ref 0–4)
REVIEW: SIGNIFICANT CHANGE UP
SP GR SPEC: 1.02 — SIGNIFICANT CHANGE UP (ref 1–1.03)
SQUAMOUS # UR AUTO: 2 /HPF — SIGNIFICANT CHANGE UP (ref 0–5)
UROBILINOGEN FLD QL: 0.2 MG/DL — SIGNIFICANT CHANGE UP (ref 0.2–1)
WBC UR QL: 0 /HPF — SIGNIFICANT CHANGE UP (ref 0–5)

## 2023-07-26 PROCEDURE — 99213 OFFICE O/P EST LOW 20 MIN: CPT

## 2023-07-27 DIAGNOSIS — N39.0 URINARY TRACT INFECTION, SITE NOT SPECIFIED: ICD-10-CM

## 2023-07-27 DIAGNOSIS — O36.80X0 PREGNANCY WITH INCONCLUSIVE FETAL VIABILITY, NOT APPLICABLE OR UNSPECIFIED: ICD-10-CM

## 2023-07-27 NOTE — HISTORY OF PRESENT ILLNESS
[FreeTextEntry1] : 23yo [id number] w/ LMP ~7/1/23 (pt uncertain exact date) presenting for follow-up bHCG testing. Patient was previously seen in the ED with after (+) home pregnancy test on 7/18 w/ bHCG of 915. Patient returned to ED on 7/20 w/ bHCG 1560 and TVUS w/ evidence of gestational sac but no fetal sac. Patient following up today w/ bHCG of 12,279. \par \par Patient is currently experiencing RLQ pain, n/v, and lower back pain relived with Tylenol. She has had some dizziness but denies syncope episodes.\par \par Patient noted dysuria and increased frequency. \par \par \par  [TextBox_4] : H/o ovarian cysts. Denies fibroids, endometriosis, abnormal paps.

## 2023-07-27 NOTE — PLAN
[FreeTextEntry1] : #Intrauterine Pregnancy \par    - Evidence of intrauterine pregnancy on in office sono \par    - F/up outpatient TVUS in one week \par   - F/up in office in 1 week \par   - Patient advised to start pre- vitamin \par \par #Dysuria \par   - F/up urine cx and UA \par   - Consider antibiotics

## 2023-07-27 NOTE — PROCEDURE
[Transvaginal OB Sonogram] : Transvaginal OB Sonogram [Intrauterine Pregnancy] : intrauterine pregnancy [Yolk Sac] : yolk sac present [CRL: ___ (mm)] : CRL - [unfilled]Umm [Fetal Heart] : no fetal heart

## 2023-07-27 NOTE — PHYSICAL EXAM
[Chaperone Present] : A chaperone was present in the examining room during all aspects of the physical examination [Appropriately responsive] : appropriately responsive [Alert] : alert [No Acute Distress] : no acute distress [Soft] : soft [Non-distended] : non-distended [FreeTextEntry7] : minimal RLQ tenderness

## 2023-07-28 LAB
CULTURE RESULTS: SIGNIFICANT CHANGE UP
SPECIMEN SOURCE: SIGNIFICANT CHANGE UP

## 2023-08-03 ENCOUNTER — APPOINTMENT (OUTPATIENT)
Dept: OBGYN | Facility: HOSPITAL | Age: 23
End: 2023-08-03

## 2023-08-03 ENCOUNTER — APPOINTMENT (OUTPATIENT)
Dept: ULTRASOUND IMAGING | Facility: IMAGING CENTER | Age: 23
End: 2023-08-03
Payer: MEDICAID

## 2023-08-03 ENCOUNTER — RESULT REVIEW (OUTPATIENT)
Age: 23
End: 2023-08-03

## 2023-08-03 ENCOUNTER — OUTPATIENT (OUTPATIENT)
Dept: OUTPATIENT SERVICES | Facility: HOSPITAL | Age: 23
LOS: 1 days | End: 2023-08-03
Payer: MEDICAID

## 2023-08-03 DIAGNOSIS — O36.80X0 PREGNANCY WITH INCONCLUSIVE FETAL VIABILITY, NOT APPLICABLE OR UNSPECIFIED: ICD-10-CM

## 2023-08-03 PROCEDURE — 76817 TRANSVAGINAL US OBSTETRIC: CPT

## 2023-08-03 PROCEDURE — 76817 TRANSVAGINAL US OBSTETRIC: CPT | Mod: 26

## 2023-08-03 PROCEDURE — 76801 OB US < 14 WKS SINGLE FETUS: CPT

## 2023-08-03 PROCEDURE — 76801 OB US < 14 WKS SINGLE FETUS: CPT | Mod: 26

## 2023-09-08 ENCOUNTER — EMERGENCY (EMERGENCY)
Facility: HOSPITAL | Age: 23
LOS: 1 days | Discharge: HOME | End: 2023-09-08
Attending: EMERGENCY MEDICINE | Admitting: EMERGENCY MEDICINE
Payer: MEDICAID

## 2023-09-08 VITALS
SYSTOLIC BLOOD PRESSURE: 101 MMHG | HEART RATE: 63 BPM | DIASTOLIC BLOOD PRESSURE: 53 MMHG | TEMPERATURE: 99 F | OXYGEN SATURATION: 100 % | RESPIRATION RATE: 17 BRPM

## 2023-09-08 VITALS
OXYGEN SATURATION: 100 % | DIASTOLIC BLOOD PRESSURE: 59 MMHG | RESPIRATION RATE: 18 BRPM | SYSTOLIC BLOOD PRESSURE: 122 MMHG | TEMPERATURE: 98 F | HEART RATE: 86 BPM

## 2023-09-08 LAB
ALBUMIN SERPL ELPH-MCNC: 4.3 G/DL — SIGNIFICANT CHANGE UP (ref 3.3–5)
ALP SERPL-CCNC: 58 U/L — SIGNIFICANT CHANGE UP (ref 40–120)
ALT FLD-CCNC: 19 U/L — SIGNIFICANT CHANGE UP (ref 4–33)
ANION GAP SERPL CALC-SCNC: 14 MMOL/L — SIGNIFICANT CHANGE UP (ref 7–14)
APPEARANCE UR: ABNORMAL
AST SERPL-CCNC: 19 U/L — SIGNIFICANT CHANGE UP (ref 4–32)
BACTERIA # UR AUTO: ABNORMAL /HPF
BASOPHILS # BLD AUTO: 0.04 K/UL — SIGNIFICANT CHANGE UP (ref 0–0.2)
BASOPHILS NFR BLD AUTO: 0.4 % — SIGNIFICANT CHANGE UP (ref 0–2)
BILIRUB SERPL-MCNC: 0.5 MG/DL — SIGNIFICANT CHANGE UP (ref 0.2–1.2)
BILIRUB UR-MCNC: NEGATIVE — SIGNIFICANT CHANGE UP
BLD GP AB SCN SERPL QL: NEGATIVE — SIGNIFICANT CHANGE UP
BUN SERPL-MCNC: 7 MG/DL — SIGNIFICANT CHANGE UP (ref 7–23)
CALCIUM SERPL-MCNC: 9.7 MG/DL — SIGNIFICANT CHANGE UP (ref 8.4–10.5)
CAST: 0 /LPF — SIGNIFICANT CHANGE UP (ref 0–4)
CHLORIDE SERPL-SCNC: 100 MMOL/L — SIGNIFICANT CHANGE UP (ref 98–107)
CO2 SERPL-SCNC: 21 MMOL/L — LOW (ref 22–31)
COLOR SPEC: YELLOW — SIGNIFICANT CHANGE UP
CREAT SERPL-MCNC: 0.47 MG/DL — LOW (ref 0.5–1.3)
DIFF PNL FLD: ABNORMAL
EGFR: 139 ML/MIN/1.73M2 — SIGNIFICANT CHANGE UP
EOSINOPHIL # BLD AUTO: 0.08 K/UL — SIGNIFICANT CHANGE UP (ref 0–0.5)
EOSINOPHIL NFR BLD AUTO: 0.7 % — SIGNIFICANT CHANGE UP (ref 0–6)
GLUCOSE SERPL-MCNC: 78 MG/DL — SIGNIFICANT CHANGE UP (ref 70–99)
GLUCOSE UR QL: NEGATIVE MG/DL — SIGNIFICANT CHANGE UP
HCG SERPL-ACNC: SIGNIFICANT CHANGE UP MIU/ML
HCT VFR BLD CALC: 32 % — LOW (ref 34.5–45)
HGB BLD-MCNC: 11.3 G/DL — LOW (ref 11.5–15.5)
IANC: 8.51 K/UL — HIGH (ref 1.8–7.4)
IMM GRANULOCYTES NFR BLD AUTO: 0.5 % — SIGNIFICANT CHANGE UP (ref 0–0.9)
KETONES UR-MCNC: 80 MG/DL
LEUKOCYTE ESTERASE UR-ACNC: ABNORMAL
LYMPHOCYTES # BLD AUTO: 1.76 K/UL — SIGNIFICANT CHANGE UP (ref 1–3.3)
LYMPHOCYTES # BLD AUTO: 16.1 % — SIGNIFICANT CHANGE UP (ref 13–44)
MCHC RBC-ENTMCNC: 29.6 PG — SIGNIFICANT CHANGE UP (ref 27–34)
MCHC RBC-ENTMCNC: 35.3 GM/DL — SIGNIFICANT CHANGE UP (ref 32–36)
MCV RBC AUTO: 83.8 FL — SIGNIFICANT CHANGE UP (ref 80–100)
MONOCYTES # BLD AUTO: 0.51 K/UL — SIGNIFICANT CHANGE UP (ref 0–0.9)
MONOCYTES NFR BLD AUTO: 4.7 % — SIGNIFICANT CHANGE UP (ref 2–14)
NEUTROPHILS # BLD AUTO: 8.51 K/UL — HIGH (ref 1.8–7.4)
NEUTROPHILS NFR BLD AUTO: 77.6 % — HIGH (ref 43–77)
NITRITE UR-MCNC: NEGATIVE — SIGNIFICANT CHANGE UP
NRBC # BLD: 0 /100 WBCS — SIGNIFICANT CHANGE UP (ref 0–0)
NRBC # FLD: 0 K/UL — SIGNIFICANT CHANGE UP (ref 0–0)
PH UR: 7 — SIGNIFICANT CHANGE UP (ref 5–8)
PLATELET # BLD AUTO: 253 K/UL — SIGNIFICANT CHANGE UP (ref 150–400)
POTASSIUM SERPL-MCNC: 3.4 MMOL/L — LOW (ref 3.5–5.3)
POTASSIUM SERPL-SCNC: 3.4 MMOL/L — LOW (ref 3.5–5.3)
PROT SERPL-MCNC: 8.4 G/DL — HIGH (ref 6–8.3)
PROT UR-MCNC: 30 MG/DL
RBC # BLD: 3.82 M/UL — SIGNIFICANT CHANGE UP (ref 3.8–5.2)
RBC # FLD: 12.9 % — SIGNIFICANT CHANGE UP (ref 10.3–14.5)
RBC CASTS # UR COMP ASSIST: 44 /HPF — HIGH (ref 0–4)
RH IG SCN BLD-IMP: POSITIVE — SIGNIFICANT CHANGE UP
SODIUM SERPL-SCNC: 135 MMOL/L — SIGNIFICANT CHANGE UP (ref 135–145)
SP GR SPEC: 1.02 — SIGNIFICANT CHANGE UP (ref 1–1.03)
SQUAMOUS # UR AUTO: 2 /HPF — SIGNIFICANT CHANGE UP (ref 0–5)
UROBILINOGEN FLD QL: 1 MG/DL — SIGNIFICANT CHANGE UP (ref 0.2–1)
WBC # BLD: 10.95 K/UL — HIGH (ref 3.8–10.5)
WBC # FLD AUTO: 10.95 K/UL — HIGH (ref 3.8–10.5)
WBC UR QL: 5 /HPF — SIGNIFICANT CHANGE UP (ref 0–5)

## 2023-09-08 PROCEDURE — 76801 OB US < 14 WKS SINGLE FETUS: CPT | Mod: 26

## 2023-09-08 PROCEDURE — 99284 EMERGENCY DEPT VISIT MOD MDM: CPT | Mod: 25

## 2023-09-08 RX ORDER — CEPHALEXIN 500 MG
1 CAPSULE ORAL
Qty: 14 | Refills: 0
Start: 2023-09-08 | End: 2023-09-14

## 2023-09-08 RX ORDER — CEPHALEXIN 500 MG
1 CAPSULE ORAL
Qty: 20 | Refills: 0
Start: 2023-09-08 | End: 2023-09-17

## 2023-09-08 NOTE — ED ADULT NURSE NOTE - OBJECTIVE STATEMENT
Patient A&Ox4 ambulatory c/o vaginal spotting starting at 1am this morning. Patient is 12 weeks pregnant, +IUP. . Due date 2024. Patient states upon arrival to ED started endorsing cramping and heavier vaginal bleeding. +blood clots. Patient on assessment, uncomfortable, tearful. Respirations even and unlabored. Abdomen soft, round and slightly tender. Patient denies chest pain, sob, n/v, palpitations, urinary symptoms, fevers/chills or any past medical history. 20G IV placed to  left antecubital, labs collected and sent to lab. Stretcher in lowest position, wheels locked, appropriate side rails in place, call bell in reach. Patient awaiting US.

## 2023-09-08 NOTE — ED ADULT NURSE NOTE - NSFALLUNIVINTERV_ED_ALL_ED
Bed/Stretcher in lowest position, wheels locked, appropriate side rails in place/Call bell, personal items and telephone in reach/Instruct patient to call for assistance before getting out of bed/chair/stretcher/Non-slip footwear applied when patient is off stretcher/[place] to call system/Physically safe environment - no spills, clutter or unnecessary equipment/Purposeful proactive rounding/Room/bathroom lighting operational, light cord in reach

## 2023-09-08 NOTE — ED PROVIDER NOTE - ATTENDING CONTRIBUTION TO CARE
The patient is a 22y Female who has a past medical and surgery history of  PTED Pt , 12 weeks pregnant(confirmed IUP), c/o vaginal bleeding starting today. Endorses pain and cramping. Appears uncomfortable. Hx:  .  pregnant, vaginal bleeding  Vital Signs Stable  PE: as described; my additions and exceptions are noted in the chart  DATA  LAB:                         11.3   10.95 )-----------( 253      ( 08 Sep 2023 03:43 )             32.0   Mean Cell Volume: 83.8 fL (23 @ 03:43)  Auto Neutrophil %: 77.6 % (23 @ 03:43)  Auto Eosinophil %: 0.7 % (23 @ 03:43)      135  |  100  |  7   ----------------------------<  78  3.4<L>   |  21<L>  |  0.47<L>    Ca    9.7      08 Sep 2023 03:43    TPro  8.4<H>  /  Alb  4.3  /  TBili  0.5  /  DBili  x   /  AST  19  /  ALT  19  /  AlkPhos  58       Urinalysis Basic - ( 08 Sep 2023 04:34 )    Color: Yellow / Appearance: Cloudy / S.017 / pH: x  Gluc: x / Ketone: 80 mg/dL  / Bili: Negative / Urobili: 1.0 mg/dL   Blood: x / Protein: 30 mg/dL / Nitrite: Negative   Leuk Esterase: Small / RBC: 44 /HPF / WBC 5 /HPF   Sq Epi: x / Non Sq Epi: 2 /HPF / Bacteria: Few /HPF    TVUS IMPRESSION:  Single live intrauterine gestation. Fetal Heart Rate: 156 bpm  Estimated gestational age of 12 weeks 1 day by crown-rump length.  Estimated due date of  3/21/2024    O+  IMPRESSION/RISK:  Dx=  IUP with vag bleeding as describe   Consideration include d/c with followup   Plan  as above   RTED PRN

## 2023-09-08 NOTE — ED PROVIDER NOTE - NSFOLLOWUPINSTRUCTIONS_ED_ALL_ED_FT
Threatened Miscarriage    WHAT YOU NEED TO KNOW:  A threatened miscarriage occurs when you have vaginal bleeding within the first 20 weeks of pregnancy. It means that a miscarriage may happen. A threatened miscarriage may also be called a threatened .  DISCHARGE INSTRUCTIONS:  Return to the emergency department if:  You feel weak or faint.  Your pain or cramping in your abdomen or back gets worse.  You have vaginal bleeding that soaks 1 or more pads in an hour.  You pass material that looks like tissue or large clots.  Contact your healthcare provider or obstetrician if:  You have a fever.  You have trouble urinating, burning when you urinate, or feel a need to urinate often.  You have new or worsening vaginal bleeding.  You have vaginal pain or itching, or vaginal discharge that is yellow, green, or foul-smelling.  You have questions or concerns about your condition or care.  Self-care: The following may help you manage your symptoms and decrease your risk for a miscarriage:  Do not put anything in your vagina. Do not have sex, douche, or use tampons. These actions may increase your risk for infection and miscarriage.  Rest as directed. Do not exercise or do strenuous activities. These activities may cause  labor or miscarriage. Ask your healthcare provider what activities are okay to do.  Stay healthy during pregnancy:  Eat a variety of healthy foods. Healthy foods can help you get extra protein, water, and calories that you need while you are pregnant. Healthy foods include fruits, vegetables, whole-grain breads, low-fat dairy products, beans, lean meats, and fish. Avoid raw or undercooked meat and fish. Ask your healthcare provider if you need a special diet.  Take prenatal vitamins as directed. These help you get the right amount of vitamins and minerals. They may also decrease the risk of certain birth defects.  Do not drink alcohol or use illegal drugs. These can increase your risk for a miscarriage or harm your baby.  Do not smoke. Nicotine and other chemicals in cigarettes and cigars can harm your baby and cause miscarriage or  labor. Ask your healthcare provider for information if you currently smoke and need help to quit. E-cigarettes or smokeless tobacco still contain nicotine. Do not use these products.  Decrease your risk for an infection. Always wash your hands before eating or preparing meals. Do not spend time with people who are sick. Ask your healthcare provider if you need immunizations such as the flu or hepatitis B vaccine. Immunizations may decrease your risk for infections that could cause a miscarriage.  Manage your medical conditions. Keep your blood pressure and blood sugars under control. Maintain a healthy weight during pregnancy.  Follow up with your obstetrician as directed: You may need to see your obstetrician frequently for ultrasounds or blood tests. Write down your questions so you remember to ask them during your visits.

## 2023-09-08 NOTE — ED POST DISCHARGE NOTE - REASON FOR FOLLOW-UP
Other Pt called to ask for different pharmacy for prescription. Rewrote cephalexin to requested pharmacy

## 2023-09-08 NOTE — ED PROVIDER NOTE - GENITOURINARY, MLM
Speculum exam with blood pooling in vaginal vault, difficulty visualizing cervix.  No masses/lesions seen.

## 2023-09-08 NOTE — ED PROVIDER NOTE - CLINICAL SUMMARY MEDICAL DECISION MAKING FREE TEXT BOX
23 yo F, 12 weeks pregnant, with vaginal bleeding for the past 3 hours and suprapubic cramping.  VSS.  Physical exam with left suprapubic tenderness and speculum exam with blood in vaginal vault, difficulty visualizing cervix.  Concern for first trimester loss vs placental abruption vs threatened .  Will check cbc, cmp, type and screen and transvaginal US.

## 2023-09-08 NOTE — ED ADULT TRIAGE NOTE - CHIEF COMPLAINT QUOTE
Pt , 12 weeks pregnant(confirmed IUP), c/o vaginal bleeding starting today. Endorses pain and cramping. Appears uncomfortable. Hx:  2020.

## 2023-09-08 NOTE — ED ADULT NURSE REASSESSMENT NOTE - NS ED NURSE REASSESS COMMENT FT1
Patient A&Ox4, respirations even and unlabored. Patient states improvement in condition. Vitals stable. Patient discharged home.

## 2023-09-08 NOTE — ED PROVIDER NOTE - PATIENT PORTAL LINK FT
You can access the FollowMyHealth Patient Portal offered by [facility] by registering at the following website: [email or web address]. By joining [employer]’s FollowMyHealth portal, you will also be able to view your health information using other applications (apps) compatible with our system.

## 2023-09-08 NOTE — ED PROVIDER NOTE - OBJECTIVE STATEMENT
22-year-old female, 12 weeks pregnant, G2, , presents to ED for vaginal bleeding which began 3 hours ago.  Patient reports initially having mild spotting that became progressively worse.  Patient also having left suprapubic abdominal cramping and bilateral low back cramping for the past 3 hours.  Patient had OB/GYN appointment 5 days ago had ultrasound with IUP.  No chest pain, shortness of breath, nausea, vomiting, lightheadedness.

## 2023-09-09 LAB
CULTURE RESULTS: SIGNIFICANT CHANGE UP
SPECIMEN SOURCE: SIGNIFICANT CHANGE UP

## 2023-09-15 ENCOUNTER — APPOINTMENT (OUTPATIENT)
Dept: ANTEPARTUM | Facility: CLINIC | Age: 23
End: 2023-09-15
Payer: MEDICAID

## 2023-09-15 ENCOUNTER — LABORATORY RESULT (OUTPATIENT)
Age: 23
End: 2023-09-15

## 2023-09-15 ENCOUNTER — ASOB RESULT (OUTPATIENT)
Age: 23
End: 2023-09-15

## 2023-09-15 PROCEDURE — 76801 OB US < 14 WKS SINGLE FETUS: CPT | Mod: 59

## 2023-09-15 PROCEDURE — 76813 OB US NUCHAL MEAS 1 GEST: CPT

## 2023-09-18 ENCOUNTER — APPOINTMENT (OUTPATIENT)
Dept: OBGYN | Facility: CLINIC | Age: 23
End: 2023-09-18

## 2023-09-24 ENCOUNTER — NON-APPOINTMENT (OUTPATIENT)
Age: 23
End: 2023-09-24

## 2023-09-25 ENCOUNTER — NON-APPOINTMENT (OUTPATIENT)
Age: 23
End: 2023-09-25

## 2023-09-25 ENCOUNTER — APPOINTMENT (OUTPATIENT)
Dept: OBGYN | Facility: CLINIC | Age: 23
End: 2023-09-25
Payer: MEDICAID

## 2023-09-25 VITALS
WEIGHT: 117 LBS | OXYGEN SATURATION: 100 % | HEIGHT: 63 IN | HEART RATE: 65 BPM | BODY MASS INDEX: 20.73 KG/M2 | SYSTOLIC BLOOD PRESSURE: 109 MMHG | DIASTOLIC BLOOD PRESSURE: 68 MMHG

## 2023-09-25 DIAGNOSIS — O46.8X1 OTHER SPECIFIED DISORDERS OF AMNIOTIC FLUID AND MEMBRANES, FIRST TRIMESTER, NOT APPLICABLE OR UNSPECIFIED: ICD-10-CM

## 2023-09-25 DIAGNOSIS — O41.8X10 OTHER SPECIFIED DISORDERS OF AMNIOTIC FLUID AND MEMBRANES, FIRST TRIMESTER, NOT APPLICABLE OR UNSPECIFIED: ICD-10-CM

## 2023-09-25 PROCEDURE — 99213 OFFICE O/P EST LOW 20 MIN: CPT | Mod: TH

## 2023-09-26 PROBLEM — O41.8X10 SUBCHORIONIC HEMATOMA IN FIRST TRIMESTER, SINGLE OR UNSPECIFIED FETUS: Status: ACTIVE | Noted: 2023-09-26

## 2023-11-07 ENCOUNTER — ASOB RESULT (OUTPATIENT)
Age: 23
End: 2023-11-07

## 2023-11-07 ENCOUNTER — APPOINTMENT (OUTPATIENT)
Dept: ANTEPARTUM | Facility: CLINIC | Age: 23
End: 2023-11-07
Payer: MEDICAID

## 2023-11-07 PROCEDURE — 76805 OB US >/= 14 WKS SNGL FETUS: CPT

## 2024-01-14 NOTE — ED PROVIDER NOTE - MUSCULOSKELETAL, MLM
Spine appears normal, range of motion is not limited, no muscle or joint tenderness No assistance needed

## 2024-01-26 ENCOUNTER — OUTPATIENT (OUTPATIENT)
Dept: OUTPATIENT SERVICES | Facility: HOSPITAL | Age: 24
LOS: 1 days | Discharge: HOME | End: 2024-01-26
Payer: MEDICAID

## 2024-01-26 VITALS
DIASTOLIC BLOOD PRESSURE: 72 MMHG | RESPIRATION RATE: 16 BRPM | HEART RATE: 97 BPM | SYSTOLIC BLOOD PRESSURE: 120 MMHG | TEMPERATURE: 98 F

## 2024-01-26 DIAGNOSIS — O26.899 OTHER SPECIFIED PREGNANCY RELATED CONDITIONS, UNSPECIFIED TRIMESTER: ICD-10-CM

## 2024-01-26 DIAGNOSIS — Z98.890 OTHER SPECIFIED POSTPROCEDURAL STATES: Chronic | ICD-10-CM

## 2024-01-26 LAB
APPEARANCE UR: ABNORMAL
BACTERIA # UR AUTO: ABNORMAL /HPF
BILIRUB UR-MCNC: NEGATIVE — SIGNIFICANT CHANGE UP
CAST: 4 /LPF — SIGNIFICANT CHANGE UP (ref 0–4)
COLOR SPEC: YELLOW — SIGNIFICANT CHANGE UP
DIFF PNL FLD: NEGATIVE — SIGNIFICANT CHANGE UP
GLUCOSE UR QL: NEGATIVE MG/DL — SIGNIFICANT CHANGE UP
KETONES UR-MCNC: NEGATIVE MG/DL — SIGNIFICANT CHANGE UP
LEUKOCYTE ESTERASE UR-ACNC: ABNORMAL
NITRITE UR-MCNC: NEGATIVE — SIGNIFICANT CHANGE UP
PH UR: 7 — SIGNIFICANT CHANGE UP (ref 5–8)
PROT UR-MCNC: SIGNIFICANT CHANGE UP MG/DL
RBC CASTS # UR COMP ASSIST: 1 /HPF — SIGNIFICANT CHANGE UP (ref 0–4)
REVIEW: SIGNIFICANT CHANGE UP
SP GR SPEC: 1.02 — SIGNIFICANT CHANGE UP (ref 1–1.03)
SQUAMOUS # UR AUTO: 17 /HPF — HIGH (ref 0–5)
UROBILINOGEN FLD QL: 1 MG/DL — SIGNIFICANT CHANGE UP (ref 0.2–1)
WBC UR QL: 16 /HPF — HIGH (ref 0–5)

## 2024-01-26 PROCEDURE — 99221 1ST HOSP IP/OBS SF/LOW 40: CPT | Mod: 25

## 2024-01-26 PROCEDURE — 59025 FETAL NON-STRESS TEST: CPT | Mod: 26

## 2024-01-26 RX ORDER — SODIUM CHLORIDE 9 MG/ML
1000 INJECTION, SOLUTION INTRAVENOUS ONCE
Refills: 0 | Status: COMPLETED | OUTPATIENT
Start: 2024-01-26 | End: 2024-01-26

## 2024-01-26 RX ADMIN — SODIUM CHLORIDE 1000 MILLILITER(S): 9 INJECTION, SOLUTION INTRAVENOUS at 21:35

## 2024-01-26 NOTE — OB PROVIDER TRIAGE NOTE - NSHPLABSRESULTS_GEN_ALL_CORE
Urinalysis Basic - ( 2024 21:31 )    Color: Yellow / Appearance: Cloudy / S.021 / pH: x  Gluc: x / Ketone: Negative mg/dL  / Bili: Negative / Urobili: 1.0 mg/dL   Blood: x / Protein: Trace mg/dL / Nitrite: Negative   Leuk Esterase: Large / RBC: 1 /HPF / WBC 16 /HPF   Sq Epi: x / Non Sq Epi: 17 /HPF / Bacteria: Many /HPF

## 2024-01-26 NOTE — OB PROVIDER TRIAGE NOTE - NSHPPHYSICALEXAM_GEN_ALL_CORE
T(C): 36.5 (01-26-24 @ 20:34), Max: 36.5 (01-26-24 @ 20:31)  HR: 92 (01-26-24 @ 20:37) (92 - 97)  BP: 123/72 (01-26-24 @ 20:37) (120/72 - 123/72)  RR: 16 (01-26-24 @ 20:31) (16 - 16)  SpO2: --  – PE:   CV: RRR  Pulm: breathing comfortably on RA  Abd: soft, gravid, nontender  Extr: moving all extremities with ease  TAUS: images saved in ASOB, vertex position, anterior placenta, NICOLE: 9.65, BPP 8/8  NST: baseline 145 FHR, moderate variability, + accels, - decels, reactive T(C): 36.5 (01-26-24 @ 20:34), Max: 36.5 (01-26-24 @ 20:31)  HR: 92 (01-26-24 @ 20:37) (92 - 97)  BP: 123/72 (01-26-24 @ 20:37) (120/72 - 123/72)  RR: 16 (01-26-24 @ 20:31) (16 - 16)  SpO2: --  – PE:   CV: RRR  Pulm: breathing comfortably on RA  Abd: soft, gravid, nontender  Extr: moving all extremities with ease  TAUS: images saved in ASOB, breech position, anterior placenta, INCOLE: 9.65, BPP 8/8  NST: baseline 145 FHR, moderate variability, + accels, - decels, reactive

## 2024-01-26 NOTE — OB PROVIDER TRIAGE NOTE - INTERNATIONAL TRAVEL
Received request via: Pharmacy    Was the patient seen in the last year in this department? Yes  1/14/20  Does the patient have an active prescription (recently filled or refills available) for medication(s) requested? No   No

## 2024-01-26 NOTE — OB PROVIDER TRIAGE NOTE - NSOBPROVIDERNOTE_OBGYN_ALL_OB_FT
- VE @ 2115 1/30/-3, will re-examine at 2315  - 1L bolus given  - @ 2130, discussed with Dr. Khalil - VE @ 2115 1/30/-3, will re-examine at 2315  - 1L bolus given  - @ 2330, VE still 1/30/-3  - Uterine irritability after examining pt, will wait until ctx space out for pt to be discharged as per Dr. Khalil 23y  @32.1w female ruled out for  labor, discharged to home.    - UA wnl  - VE @ 2115 1/30/-3, will re-examine at 2315  - 1L bolus given  - @ 2330, VE still 1/30/-3  - Uterine irritability after examining pt, will wait until ctx space out for pt to be discharged as per Dr. Khalil  - @0000 pt cleared for discharge  - Patient to be discharged home with follow up and return precautions  - Please follow up with your obstetrician at your next scheduled appointment.   - Please return for decreased/no fetal movement, vaginal bleeding similar to that of a period, leaking/gush of fluid, regular contractions occurring 4-5 minutes for one hour or requiring pain medication   - Patient and partner educated of plan and demonstrate understanding. All questions answered. Discharge instructions provided and signed.   - Discharged at 0005  - Discussed with Dr. Khalil

## 2024-01-26 NOTE — OB PROVIDER TRIAGE NOTE - HISTORY OF PRESENT ILLNESS
23y  EGA@ 32.0 weeks, presents for ctx x 1 hr. Pt reports argument with partner's family 1 hr prior and then feeling ctx soon after. Endorses pelvic pressure. Pt denies dysuria, polyuria, urinary frequency. Patient reports  + fetal movements,  denies leaking of fluid, denies vaginal bleeding. Pt denies any other concerns.  Antepartum course is significant for:  - Hyperemesis gravidarum  - Resolved subchorionic hematoma

## 2024-01-27 VITALS — HEART RATE: 86 BPM | DIASTOLIC BLOOD PRESSURE: 51 MMHG | SYSTOLIC BLOOD PRESSURE: 95 MMHG

## 2024-01-30 ENCOUNTER — NON-APPOINTMENT (OUTPATIENT)
Age: 24
End: 2024-01-30

## 2024-01-30 DIAGNOSIS — O21.2 LATE VOMITING OF PREGNANCY: ICD-10-CM

## 2024-01-30 DIAGNOSIS — O46.93 ANTEPARTUM HEMORRHAGE, UNSPECIFIED, THIRD TRIMESTER: ICD-10-CM

## 2024-01-30 DIAGNOSIS — Z3A.32 32 WEEKS GESTATION OF PREGNANCY: ICD-10-CM

## 2024-01-30 DIAGNOSIS — O47.03 FALSE LABOR BEFORE 37 COMPLETED WEEKS OF GESTATION, THIRD TRIMESTER: ICD-10-CM

## 2024-02-13 ENCOUNTER — OUTPATIENT (OUTPATIENT)
Dept: INPATIENT UNIT | Facility: HOSPITAL | Age: 24
LOS: 1 days | Discharge: HOME | End: 2024-02-13
Payer: MEDICAID

## 2024-02-13 VITALS — SYSTOLIC BLOOD PRESSURE: 111 MMHG | HEART RATE: 80 BPM | DIASTOLIC BLOOD PRESSURE: 63 MMHG

## 2024-02-13 VITALS
SYSTOLIC BLOOD PRESSURE: 112 MMHG | DIASTOLIC BLOOD PRESSURE: 68 MMHG | RESPIRATION RATE: 15 BRPM | TEMPERATURE: 98 F | HEART RATE: 88 BPM

## 2024-02-13 DIAGNOSIS — O26.899 OTHER SPECIFIED PREGNANCY RELATED CONDITIONS, UNSPECIFIED TRIMESTER: ICD-10-CM

## 2024-02-13 DIAGNOSIS — Z98.890 OTHER SPECIFIED POSTPROCEDURAL STATES: Chronic | ICD-10-CM

## 2024-02-13 LAB
APPEARANCE UR: ABNORMAL
BACTERIA # UR AUTO: ABNORMAL /HPF
BILIRUB UR-MCNC: NEGATIVE — SIGNIFICANT CHANGE UP
CAST: 2 /LPF — SIGNIFICANT CHANGE UP (ref 0–4)
COLOR SPEC: YELLOW — SIGNIFICANT CHANGE UP
DIFF PNL FLD: NEGATIVE — SIGNIFICANT CHANGE UP
GLUCOSE UR QL: NEGATIVE MG/DL — SIGNIFICANT CHANGE UP
KETONES UR-MCNC: NEGATIVE MG/DL — SIGNIFICANT CHANGE UP
LEUKOCYTE ESTERASE UR-ACNC: ABNORMAL
NITRITE UR-MCNC: NEGATIVE — SIGNIFICANT CHANGE UP
PH UR: 7.5 — SIGNIFICANT CHANGE UP (ref 5–8)
PROT UR-MCNC: SIGNIFICANT CHANGE UP MG/DL
RBC CASTS # UR COMP ASSIST: 1 /HPF — SIGNIFICANT CHANGE UP (ref 0–4)
REVIEW: SIGNIFICANT CHANGE UP
SP GR SPEC: 1.02 — SIGNIFICANT CHANGE UP (ref 1–1.03)
SQUAMOUS # UR AUTO: 18 /HPF — HIGH (ref 0–5)
UROBILINOGEN FLD QL: 1 MG/DL — SIGNIFICANT CHANGE UP (ref 0.2–1)
WBC UR QL: 46 /HPF — HIGH (ref 0–5)

## 2024-02-13 PROCEDURE — 99221 1ST HOSP IP/OBS SF/LOW 40: CPT | Mod: 25

## 2024-02-13 PROCEDURE — 59025 FETAL NON-STRESS TEST: CPT | Mod: 26

## 2024-02-13 RX ORDER — SODIUM CHLORIDE 9 MG/ML
1000 INJECTION, SOLUTION INTRAVENOUS ONCE
Refills: 0 | Status: DISCONTINUED | OUTPATIENT
Start: 2024-02-13 | End: 2024-02-13

## 2024-02-13 NOTE — OB PROVIDER TRIAGE NOTE - NSHPLABSRESULTS_GEN_ALL_CORE
Urinalysis Basic - ( 2024 20:18 )    Color: Yellow / Appearance: Cloudy / S.016 / pH: x  Gluc: x / Ketone: Negative mg/dL  / Bili: Negative / Urobili: 1.0 mg/dL   Blood: x / Protein: Trace mg/dL / Nitrite: Negative   Leuk Esterase: Large / RBC: 1 /HPF / WBC 46 /HPF   Sq Epi: x / Non Sq Epi: 18 /HPF / Bacteria: Many /HPF

## 2024-02-13 NOTE — OB PROVIDER TRIAGE NOTE - HISTORY OF PRESENT ILLNESS
23y  EGA@ 34.4 weeks, presents for ctx x 3 days. Pt denies dysuria, polyuria, urinary frequency. Patient reports  + fetal movements, denies leaking of fluid, denies vaginal bleeding. Pt denies any other concerns. Pt was seen in triage on  to rule out  labor and was /-3.  Antepartum course is significant for:  - PNC: Dr. Kamara  - Hyperemesis gravidarum  - Resolved subchorionic hematoma                       23y  DARYL@ 34.4 weeks, presents for ctx x 3 days. Endorses back cramping starting today. Pt denies dysuria, polyuria, urinary frequency. Patient reports  + fetal movements, denies leaking of fluid, denies vaginal bleeding. Pt denies any other concerns. Pt was seen in triage on  to rule out  labor and was /-3.  Antepartum course is significant for:  - PNC: Dr. Kamara  - Hyperemesis gravidarum  - Resolved subchorionic hematoma

## 2024-02-13 NOTE — OB PROVIDER TRIAGE NOTE - NSOBPROVIDERNOTE_OBGYN_ALL_OB_FT
- - Pt ronal very irregularly, VE 1/30/-3 unchanged from last triage visit   - Given PO hydration  - UA wnl, no evidence of UTI  - Called Dr. Kamara @2045 and left message - 23y  @34.4w female ruled out for  labor, discharged to home.    - Pt ronal very irregularly, VE /3 unchanged from last triage visit   - Given PO hydration  - UA showed large leuk esterase, 46 WBC, bacteria, and epithelial cells  - Urine culture sent  - Patient to be discharged home with follow up and return precautions  - Please follow up with your obstetrician at your next scheduled appointment.   - Please return for decreased/no fetal movement, vaginal bleeding similar to that of a period, leaking/gush of fluid, regular contractions occurring 4-5 minutes for one hour or requiring pain medication   - Patient and partner educated of plan and demonstrate understanding. All questions answered. Discharge instructions provided and signed.   - Discharged at   - Discussed with Dr. Kamara

## 2024-02-15 DIAGNOSIS — Z3A.34 34 WEEKS GESTATION OF PREGNANCY: ICD-10-CM

## 2024-02-15 DIAGNOSIS — O46.93 ANTEPARTUM HEMORRHAGE, UNSPECIFIED, THIRD TRIMESTER: ICD-10-CM

## 2024-02-15 DIAGNOSIS — O21.2 LATE VOMITING OF PREGNANCY: ICD-10-CM

## 2024-02-15 DIAGNOSIS — O47.03 FALSE LABOR BEFORE 37 COMPLETED WEEKS OF GESTATION, THIRD TRIMESTER: ICD-10-CM

## 2024-02-15 LAB
CULTURE RESULTS: SIGNIFICANT CHANGE UP
SPECIMEN SOURCE: SIGNIFICANT CHANGE UP

## 2024-02-20 ENCOUNTER — OUTPATIENT (OUTPATIENT)
Dept: INPATIENT UNIT | Facility: HOSPITAL | Age: 24
LOS: 1 days | Discharge: HOME | End: 2024-02-20
Payer: MEDICAID

## 2024-02-20 ENCOUNTER — APPOINTMENT (OUTPATIENT)
Dept: ANTEPARTUM | Facility: CLINIC | Age: 24
End: 2024-02-20

## 2024-02-20 VITALS
DIASTOLIC BLOOD PRESSURE: 68 MMHG | SYSTOLIC BLOOD PRESSURE: 119 MMHG | HEART RATE: 93 BPM | RESPIRATION RATE: 16 BRPM | TEMPERATURE: 98 F

## 2024-02-20 VITALS — HEART RATE: 86 BPM | SYSTOLIC BLOOD PRESSURE: 117 MMHG | DIASTOLIC BLOOD PRESSURE: 64 MMHG

## 2024-02-20 DIAGNOSIS — Z98.890 OTHER SPECIFIED POSTPROCEDURAL STATES: Chronic | ICD-10-CM

## 2024-02-20 DIAGNOSIS — O26.899 OTHER SPECIFIED PREGNANCY RELATED CONDITIONS, UNSPECIFIED TRIMESTER: ICD-10-CM

## 2024-02-20 DIAGNOSIS — Z36.89 ENCOUNTER FOR OTHER SPECIFIED ANTENATAL SCREENING: ICD-10-CM

## 2024-02-20 LAB
APPEARANCE UR: CLEAR — SIGNIFICANT CHANGE UP
BACTERIA # UR AUTO: ABNORMAL /HPF
BILIRUB UR-MCNC: NEGATIVE — SIGNIFICANT CHANGE UP
CAST: 0 /LPF — SIGNIFICANT CHANGE UP (ref 0–4)
COLOR SPEC: YELLOW — SIGNIFICANT CHANGE UP
DIFF PNL FLD: NEGATIVE — SIGNIFICANT CHANGE UP
GLUCOSE UR QL: NEGATIVE MG/DL — SIGNIFICANT CHANGE UP
KETONES UR-MCNC: NEGATIVE MG/DL — SIGNIFICANT CHANGE UP
LEUKOCYTE ESTERASE UR-ACNC: ABNORMAL
NITRITE UR-MCNC: NEGATIVE — SIGNIFICANT CHANGE UP
PH UR: 6.5 — SIGNIFICANT CHANGE UP (ref 5–8)
PROT UR-MCNC: NEGATIVE MG/DL — SIGNIFICANT CHANGE UP
RBC CASTS # UR COMP ASSIST: 1 /HPF — SIGNIFICANT CHANGE UP (ref 0–4)
REVIEW: SIGNIFICANT CHANGE UP
SP GR SPEC: 1.02 — SIGNIFICANT CHANGE UP (ref 1–1.03)
SQUAMOUS # UR AUTO: 12 /HPF — HIGH (ref 0–5)
UROBILINOGEN FLD QL: 1 MG/DL — SIGNIFICANT CHANGE UP (ref 0.2–1)
WBC UR QL: 11 /HPF — HIGH (ref 0–5)

## 2024-02-20 PROCEDURE — 83986 ASSAY PH BODY FLUID NOS: CPT | Mod: QW

## 2024-02-20 PROCEDURE — 59025 FETAL NON-STRESS TEST: CPT | Mod: 26

## 2024-02-20 PROCEDURE — 99221 1ST HOSP IP/OBS SF/LOW 40: CPT | Mod: 25

## 2024-02-20 NOTE — OB PROVIDER TRIAGE NOTE - ADDITIONAL INSTRUCTIONS
22 y/o  @ 35.4 wks gestation presents with c/o ?PPROM and r/o PTL :  no evidence of PPROM   no evidence of PTL  maternal and fetal status reassuring   plan of care d/w dr crook   pt to be discharged home   discharge home   PTL precautions d/w pt  increase fluid intake  instructed on fetal kick counts   follow up with [facility] as sched   w/v discharge instructions given  discharged home     discharge @ 3684

## 2024-02-20 NOTE — OB PROVIDER TRIAGE NOTE - NSOBPROVIDERNOTE_OBGYN_ALL_OB_FT
22 y/o  @ 35.4 wks gestation presents with c/o ?PPROM and r/o PTL : 24 y/o  @ 35.4 wks gestation presents with c/o ?PPROM and r/o PTL :  no evidence of PPROM   no evidence of PTL  maternal and fetal status reassuring   plan of care d/w dr crook   pt to be discharged home   discharge home   PTL precautions d/w pt  increase fluid intake  instructed on fetal kick counts   follow up with [facility] as sched   w/v discharge instructions given  discharged home     discharge @ 3960

## 2024-02-20 NOTE — OB PROVIDER TRIAGE NOTE - HISTORY OF PRESENT ILLNESS
24 y/o  @ 35.4 wks gestation presents with c/o ?PPROM @ 1210 states felt " liquid" come out of my vagina and states was unable  to control flow states was sitting in her chair @ work when this happened and states around  1215 started having uterine contractions every 5-6 minutes states her pain is 6/10 on pain scale denies any VB reports +FM denies any n/v/d denies any fever or chills ap care comp by :  subchorionic hematoma resolved   hyperemesis til 6 mos , Zofran prn

## 2024-02-20 NOTE — OB PROVIDER TRIAGE NOTE - NS_LMP_OBGYN_ALL_OB_DT
MANTOUX TUBERCULIN (a.k.a. TB) SKIN TEST      What is Tuberculosis/TB?  Tuberculosis/TB is an infectious disease, which is spread through the air by tiny germs when people cough, sneeze, speak or sing.  TB germs are very small and can remain in the air for a long period of time. TB germs most often settle in your lungs, but may also settle in other organs of your body.  TB germs can be present in your body without making you ill.  This is called TB INFECTION.  TB infection cannot be spread to other people.  When your body’s defenses become weak and can no longer control the TB germs they multiply, this is called TB DISEASE.  It can take month(s) to year(s) for TB infection to become TB disease.  The TB SKIN TEST can show if a person has been “infected” by TB germs.    How is the Mantoux Tuberculin/TB skin test given?  A very small amount of a product called Purified Protein Derivative (PPD) is injected just under the top layer of the skin on the forearm.  Persons who have been infected by the TB germs usually react to the test by developing swelling at the injection site.  The skin test results must be read 48 to 72 hours after given.  Failure to return within this time frame means the test will need to be repeated.    Side effects…  Side effects from a skin test are rare and may include itching and discomfort at the injection site and very rarely the possibility of a blister, ulceration or necrosis (dead tissue) at the site if the injection.    Return to:  Any [facility] site on 48-72 hours               VACCINE ADMINISTERED, VACCINE FORM SCANNED IN PROGRESS NOTES    Take Tylenol or ibuprofen per package directions as needed for pain.  •If you think it is a severe allergic reaction or other emergency that can’t wait, call 9-1-1 and get the person to the nearest hospital. Otherwise, call your doctor.  •Reactions should be reported to the \"Vaccine Adverse Event Reporting System\" (VAERS). Your doctor  should file this report, or you can do it yourself through the [employer] website, or by calling [phone].     15-Dominik-2023

## 2024-02-20 NOTE — OB PROVIDER TRIAGE NOTE - NSHPPHYSICALEXAM_GEN_ALL_CORE
abdomen: soft, nt on palp  SSE:   no evidence of pooling   small amount light green fluid noted likely c/w yeast   fern- neg  nitrazine- neg   SVE: 1/60/-3  T(C): 36.6 (02-20-24 @ 14:07), Max: 36.6 (02-20-24 @ 14:07)  HR: 83 (02-20-24 @ 15:19) (83 - 93)  BP: 119/63 (02-20-24 @ 15:19) (114/60 - 120/65)  RR: 16 (02-20-24 @ 14:07) (16 - 16)  SpO2: --  NST in progress  sono images saved in ASOB   TAS: BPP: 8/8 breech NICOLE: 15.11 posterior placenta abdomen: soft, nt on palp  SSE:   no evidence of pooling   small amount light green fluid noted likely c/w yeast   fern- neg  nitrazine- neg   SVE: 1/60/-3, no change in exam from office visit  T(C): 36.6 (02-20-24 @ 14:07), Max: 36.6 (02-20-24 @ 14:07)  HR: 83 (02-20-24 @ 15:19) (83 - 93)  BP: 119/63 (02-20-24 @ 15:19) (114/60 - 120/65)  RR: 16 (02-20-24 @ 14:07) (16 - 16)  SpO2: --  NST in progress  sono images saved in ASOB   TAS: BPP: 8/8 breech NICOLE: 15.11 posterior placenta    addendum @ 1540:  NST reactive   FH baseline 140 FH variability mod +FH accels no FH decels   toco: irregular uterine contractions

## 2024-02-22 DIAGNOSIS — Z3A.35 35 WEEKS GESTATION OF PREGNANCY: ICD-10-CM

## 2024-02-22 DIAGNOSIS — O21.9 VOMITING OF PREGNANCY, UNSPECIFIED: ICD-10-CM

## 2024-02-22 DIAGNOSIS — Z03.71 ENCOUNTER FOR SUSPECTED PROBLEM WITH AMNIOTIC CAVITY AND MEMBRANE RULED OUT: ICD-10-CM

## 2024-02-22 DIAGNOSIS — O47.03 FALSE LABOR BEFORE 37 COMPLETED WEEKS OF GESTATION, THIRD TRIMESTER: ICD-10-CM

## 2024-02-23 ENCOUNTER — OUTPATIENT (OUTPATIENT)
Dept: INPATIENT UNIT | Facility: HOSPITAL | Age: 24
LOS: 1 days | Discharge: HOME | End: 2024-02-23
Payer: MEDICAID

## 2024-02-23 VITALS
TEMPERATURE: 98 F | SYSTOLIC BLOOD PRESSURE: 96 MMHG | DIASTOLIC BLOOD PRESSURE: 68 MMHG | HEART RATE: 89 BPM | RESPIRATION RATE: 18 BRPM

## 2024-02-23 DIAGNOSIS — O26.899 OTHER SPECIFIED PREGNANCY RELATED CONDITIONS, UNSPECIFIED TRIMESTER: ICD-10-CM

## 2024-02-23 DIAGNOSIS — Z98.890 OTHER SPECIFIED POSTPROCEDURAL STATES: Chronic | ICD-10-CM

## 2024-02-23 PROCEDURE — 59025 FETAL NON-STRESS TEST: CPT | Mod: 26

## 2024-02-23 PROCEDURE — 99221 1ST HOSP IP/OBS SF/LOW 40: CPT | Mod: 25

## 2024-02-23 PROCEDURE — 83986 ASSAY PH BODY FLUID NOS: CPT | Mod: QW

## 2024-02-23 NOTE — OB RN TRIAGE NOTE - CHIEF COMPLAINT QUOTE
"I am leaking fluid from 10.30 pm and have contractions " High Dose Vitamin A Pregnancy And Lactation Text: High dose vitamin A therapy is contraindicated during pregnancy and breast feeding.

## 2024-02-24 VITALS — HEART RATE: 86 BPM | DIASTOLIC BLOOD PRESSURE: 79 MMHG | SYSTOLIC BLOOD PRESSURE: 131 MMHG

## 2024-02-24 LAB
AMNISURE ROM (RUPTURE OF MEMBRANES): NEGATIVE — SIGNIFICANT CHANGE UP
APPEARANCE UR: CLEAR — SIGNIFICANT CHANGE UP
BACTERIA # UR AUTO: ABNORMAL /HPF
BILIRUB UR-MCNC: NEGATIVE — SIGNIFICANT CHANGE UP
CAST: 2 /LPF — SIGNIFICANT CHANGE UP (ref 0–4)
COLOR SPEC: YELLOW — SIGNIFICANT CHANGE UP
DIFF PNL FLD: NEGATIVE — SIGNIFICANT CHANGE UP
GLUCOSE UR QL: NEGATIVE MG/DL — SIGNIFICANT CHANGE UP
KETONES UR-MCNC: NEGATIVE MG/DL — SIGNIFICANT CHANGE UP
LEUKOCYTE ESTERASE UR-ACNC: ABNORMAL
NITRITE UR-MCNC: NEGATIVE — SIGNIFICANT CHANGE UP
PH UR: 6.5 — SIGNIFICANT CHANGE UP (ref 5–8)
PROT UR-MCNC: NEGATIVE MG/DL — SIGNIFICANT CHANGE UP
RBC CASTS # UR COMP ASSIST: 0 /HPF — SIGNIFICANT CHANGE UP (ref 0–4)
SP GR SPEC: 1.01 — SIGNIFICANT CHANGE UP (ref 1–1.03)
SQUAMOUS # UR AUTO: 3 /HPF — SIGNIFICANT CHANGE UP (ref 0–5)
UROBILINOGEN FLD QL: 0.2 MG/DL — SIGNIFICANT CHANGE UP (ref 0.2–1)
WBC UR QL: 8 /HPF — HIGH (ref 0–5)

## 2024-02-24 RX ORDER — SODIUM CHLORIDE 9 MG/ML
1000 INJECTION, SOLUTION INTRAVENOUS ONCE
Refills: 0 | Status: COMPLETED | OUTPATIENT
Start: 2024-02-24 | End: 2024-02-24

## 2024-02-24 RX ORDER — MICONAZOLE NITRATE 2 %
1 CREAM (GRAM) TOPICAL
Qty: 1 | Refills: 0
Start: 2024-02-24 | End: 2024-03-01

## 2024-02-24 RX ADMIN — SODIUM CHLORIDE 1000 MILLILITER(S): 9 INJECTION, SOLUTION INTRAVENOUS at 00:29

## 2024-02-24 NOTE — OB PROVIDER TRIAGE NOTE - NSHPPHYSICALEXAM_GEN_ALL_CORE
ICU Vital Signs Last 24 Hrs  T(C): 36.7 (23 Feb 2024 23:21), Max: 36.7 (23 Feb 2024 22:52)  T(F): 98.06 (23 Feb 2024 23:21), Max: 98.1 (23 Feb 2024 22:52)  HR: 80 (23 Feb 2024 23:23) (80 - 89)  BP: 110/59 (23 Feb 2024 23:23) (96/68 - 110/59)  BP(mean): --  ABP: --  ABP(mean): --  RR: 18 (23 Feb 2024 22:52) (18 - 18)  SpO2: --    Abdomen soft nontender  Speculum: Negative pooling, nitrazine and fern   SVE: 1/50/-3  TAS: Breech presentation, anterior placenta, NICOLE: 12.85, BPP: 8/8   Category I/Reactive NST  Lucas irregularly

## 2024-02-24 NOTE — OB PROVIDER TRIAGE NOTE - HISTORY OF PRESENT ILLNESS
Pt. is a 24y/o  EGA 36wks reports of leakage of fluid since 2200 x1 episode and painful contractions. Pt. denies vaginal bleeding. Pt. reports good fetal movement.     PNC: Dr. Henok Miranda

## 2024-02-24 NOTE — OB PROVIDER TRIAGE NOTE - NSOBPROVIDERNOTE_OBGYN_ALL_OB_FT
UA ordered   Amnisure ordered   1L RL bolus UA ordered   Amnisure ordered   1L RL bolus    @0050  Discussed findings with Dr. Whiting UA ordered   Amnisure ordered   1L RL bolus    @0050  Discussed findings with Dr. Whiting. Pt. d/c'd home. Miconazole 2% cream for seven days prescribed to pharmacy. Pt. instructed to follow up with next OB appointment. Pt. instructed to return to triage with increase abdominal contractions, leakage of fluid, vaginal bleeding or decrease fetal movement. Increase oral hydration encouraged.

## 2024-02-24 NOTE — OB PROVIDER TRIAGE NOTE - NSHPLABSRESULTS_GEN_ALL_CORE
Urinalysis Basic - ( 2024 00:09 )    Color: Yellow / Appearance: Clear / S.012 / pH: x  Gluc: x / Ketone: Negative mg/dL  / Bili: Negative / Urobili: 0.2 mg/dL   Blood: x / Protein: Negative mg/dL / Nitrite: Negative   Leuk Esterase: Small / RBC: 0 /HPF / WBC 8 /HPF   Sq Epi: x / Non Sq Epi: 3 /HPF / Bacteria: Occasional /HPF    Amnisure Neg.

## 2024-02-24 NOTE — OB PROVIDER TRIAGE NOTE - ADDITIONAL INSTRUCTIONS
Pt. d/c'd home. Miconazole 2% cream for seven days prescribed to pharmacy. Pt. instructed to follow up with next OB appointment. Pt. instructed to return to triage with increase abdominal contractions, leakage of fluid, vaginal bleeding or decrease fetal movement. Increase oral hydration encouraged.

## 2024-02-26 ENCOUNTER — TRANSCRIPTION ENCOUNTER (OUTPATIENT)
Age: 24
End: 2024-02-26

## 2024-02-26 ENCOUNTER — INPATIENT (INPATIENT)
Facility: HOSPITAL | Age: 24
LOS: 2 days | Discharge: HOME | End: 2024-02-29
Attending: STUDENT IN AN ORGANIZED HEALTH CARE EDUCATION/TRAINING PROGRAM | Admitting: STUDENT IN AN ORGANIZED HEALTH CARE EDUCATION/TRAINING PROGRAM
Payer: MEDICAID

## 2024-02-26 VITALS
DIASTOLIC BLOOD PRESSURE: 65 MMHG | HEART RATE: 85 BPM | TEMPERATURE: 98 F | RESPIRATION RATE: 16 BRPM | SYSTOLIC BLOOD PRESSURE: 111 MMHG

## 2024-02-26 DIAGNOSIS — O47.03 FALSE LABOR BEFORE 37 COMPLETED WEEKS OF GESTATION, THIRD TRIMESTER: ICD-10-CM

## 2024-02-26 DIAGNOSIS — Z3A.36 36 WEEKS GESTATION OF PREGNANCY: ICD-10-CM

## 2024-02-26 DIAGNOSIS — O26.899 OTHER SPECIFIED PREGNANCY RELATED CONDITIONS, UNSPECIFIED TRIMESTER: ICD-10-CM

## 2024-02-26 DIAGNOSIS — O46.93 ANTEPARTUM HEMORRHAGE, UNSPECIFIED, THIRD TRIMESTER: ICD-10-CM

## 2024-02-26 DIAGNOSIS — Z98.890 OTHER SPECIFIED POSTPROCEDURAL STATES: Chronic | ICD-10-CM

## 2024-02-26 DIAGNOSIS — Z03.71 ENCOUNTER FOR SUSPECTED PROBLEM WITH AMNIOTIC CAVITY AND MEMBRANE RULED OUT: ICD-10-CM

## 2024-02-26 LAB
BASOPHILS # BLD AUTO: 0.08 K/UL — SIGNIFICANT CHANGE UP (ref 0–0.2)
BASOPHILS NFR BLD AUTO: 0.5 % — SIGNIFICANT CHANGE UP (ref 0–2)
BLD GP AB SCN SERPL QL: NEGATIVE — SIGNIFICANT CHANGE UP
EOSINOPHIL # BLD AUTO: 0.06 K/UL — SIGNIFICANT CHANGE UP (ref 0–0.5)
EOSINOPHIL NFR BLD AUTO: 0.4 % — SIGNIFICANT CHANGE UP (ref 0–6)
HCT VFR BLD CALC: 37 % — SIGNIFICANT CHANGE UP (ref 34.5–45)
HGB BLD-MCNC: 12.7 G/DL — SIGNIFICANT CHANGE UP (ref 11.5–15.5)
IANC: 13.05 K/UL — HIGH (ref 1.8–7.4)
IMM GRANULOCYTES NFR BLD AUTO: 2.7 % — HIGH (ref 0–0.9)
LYMPHOCYTES # BLD AUTO: 13.4 % — SIGNIFICANT CHANGE UP (ref 13–44)
LYMPHOCYTES # BLD AUTO: 2.27 K/UL — SIGNIFICANT CHANGE UP (ref 1–3.3)
MCHC RBC-ENTMCNC: 31.1 PG — SIGNIFICANT CHANGE UP (ref 27–34)
MCHC RBC-ENTMCNC: 34.3 GM/DL — SIGNIFICANT CHANGE UP (ref 32–36)
MCV RBC AUTO: 90.7 FL — SIGNIFICANT CHANGE UP (ref 80–100)
MONOCYTES # BLD AUTO: 1.03 K/UL — HIGH (ref 0–0.9)
MONOCYTES NFR BLD AUTO: 6.1 % — SIGNIFICANT CHANGE UP (ref 2–14)
NEUTROPHILS # BLD AUTO: 13.05 K/UL — HIGH (ref 1.8–7.4)
NEUTROPHILS NFR BLD AUTO: 76.9 % — SIGNIFICANT CHANGE UP (ref 43–77)
NRBC # BLD: 0 /100 WBCS — SIGNIFICANT CHANGE UP (ref 0–0)
NRBC # FLD: 0 K/UL — SIGNIFICANT CHANGE UP (ref 0–0)
PLATELET # BLD AUTO: 248 K/UL — SIGNIFICANT CHANGE UP (ref 150–400)
RBC # BLD: 4.08 M/UL — SIGNIFICANT CHANGE UP (ref 3.8–5.2)
RBC # FLD: 13.2 % — SIGNIFICANT CHANGE UP (ref 10.3–14.5)
RH IG SCN BLD-IMP: POSITIVE — SIGNIFICANT CHANGE UP
WBC # BLD: 16.95 K/UL — HIGH (ref 3.8–10.5)
WBC # FLD AUTO: 16.95 K/UL — HIGH (ref 3.8–10.5)

## 2024-02-26 DEVICE — SURGICEL FIBRILLAR 1 X 2": Type: IMPLANTABLE DEVICE | Status: FUNCTIONAL

## 2024-02-26 RX ORDER — OXYCODONE HYDROCHLORIDE 5 MG/1
5 TABLET ORAL
Refills: 0 | Status: COMPLETED | OUTPATIENT
Start: 2024-02-27 | End: 2024-03-05

## 2024-02-26 RX ORDER — OXYCODONE HYDROCHLORIDE 5 MG/1
5 TABLET ORAL ONCE
Refills: 0 | Status: DISCONTINUED | OUTPATIENT
Start: 2024-02-27 | End: 2024-02-29

## 2024-02-26 RX ORDER — OXYTOCIN 10 UNIT/ML
333.33 VIAL (ML) INJECTION
Qty: 20 | Refills: 0 | Status: DISCONTINUED | OUTPATIENT
Start: 2024-02-26 | End: 2024-02-27

## 2024-02-26 RX ORDER — INFLUENZA VIRUS VACCINE 15; 15; 15; 15 UG/.5ML; UG/.5ML; UG/.5ML; UG/.5ML
0.5 SUSPENSION INTRAMUSCULAR ONCE
Refills: 0 | Status: DISCONTINUED | OUTPATIENT
Start: 2024-02-26 | End: 2024-02-29

## 2024-02-26 RX ORDER — HEPARIN SODIUM 5000 [USP'U]/ML
5000 INJECTION INTRAVENOUS; SUBCUTANEOUS EVERY 12 HOURS
Refills: 0 | Status: DISCONTINUED | OUTPATIENT
Start: 2024-02-27 | End: 2024-02-29

## 2024-02-26 RX ORDER — KETOROLAC TROMETHAMINE 30 MG/ML
30 SYRINGE (ML) INJECTION EVERY 6 HOURS
Refills: 0 | Status: DISCONTINUED | OUTPATIENT
Start: 2024-02-27 | End: 2024-02-27

## 2024-02-26 RX ORDER — DIPHENHYDRAMINE HCL 50 MG
25 CAPSULE ORAL EVERY 6 HOURS
Refills: 0 | Status: DISCONTINUED | OUTPATIENT
Start: 2024-02-27 | End: 2024-02-29

## 2024-02-26 RX ORDER — FAMOTIDINE 10 MG/ML
20 INJECTION INTRAVENOUS ONCE
Refills: 0 | Status: COMPLETED | OUTPATIENT
Start: 2024-02-26 | End: 2024-02-26

## 2024-02-26 RX ORDER — MAGNESIUM HYDROXIDE 400 MG/1
30 TABLET, CHEWABLE ORAL
Refills: 0 | Status: DISCONTINUED | OUTPATIENT
Start: 2024-02-27 | End: 2024-02-29

## 2024-02-26 RX ORDER — LANOLIN
1 OINTMENT (GRAM) TOPICAL EVERY 6 HOURS
Refills: 0 | Status: DISCONTINUED | OUTPATIENT
Start: 2024-02-27 | End: 2024-02-29

## 2024-02-26 RX ORDER — SIMETHICONE 80 MG/1
80 TABLET, CHEWABLE ORAL EVERY 4 HOURS
Refills: 0 | Status: DISCONTINUED | OUTPATIENT
Start: 2024-02-27 | End: 2024-02-29

## 2024-02-26 RX ORDER — IBUPROFEN 200 MG
600 TABLET ORAL EVERY 6 HOURS
Refills: 0 | Status: COMPLETED | OUTPATIENT
Start: 2024-02-27 | End: 2025-01-25

## 2024-02-26 RX ORDER — SODIUM CHLORIDE 9 MG/ML
1000 INJECTION, SOLUTION INTRAVENOUS ONCE
Refills: 0 | Status: DISCONTINUED | OUTPATIENT
Start: 2024-02-26 | End: 2024-02-27

## 2024-02-26 RX ORDER — ACETAMINOPHEN 500 MG
975 TABLET ORAL
Refills: 0 | Status: DISCONTINUED | OUTPATIENT
Start: 2024-02-27 | End: 2024-02-29

## 2024-02-26 RX ORDER — CITRIC ACID/SODIUM CITRATE 300-500 MG
30 SOLUTION, ORAL ORAL ONCE
Refills: 0 | Status: COMPLETED | OUTPATIENT
Start: 2024-02-26 | End: 2024-02-26

## 2024-02-26 RX ORDER — SODIUM CHLORIDE 9 MG/ML
1000 INJECTION, SOLUTION INTRAVENOUS
Refills: 0 | Status: DISCONTINUED | OUTPATIENT
Start: 2024-02-26 | End: 2024-02-27

## 2024-02-26 RX ORDER — TETANUS TOXOID, REDUCED DIPHTHERIA TOXOID AND ACELLULAR PERTUSSIS VACCINE, ADSORBED 5; 2.5; 8; 8; 2.5 [IU]/.5ML; [IU]/.5ML; UG/.5ML; UG/.5ML; UG/.5ML
0.5 SUSPENSION INTRAMUSCULAR ONCE
Refills: 0 | Status: DISCONTINUED | OUTPATIENT
Start: 2024-02-27 | End: 2024-02-29

## 2024-02-26 RX ORDER — CHLORHEXIDINE GLUCONATE 213 G/1000ML
1 SOLUTION TOPICAL DAILY
Refills: 0 | Status: DISCONTINUED | OUTPATIENT
Start: 2024-02-26 | End: 2024-02-27

## 2024-02-26 RX ADMIN — CHLORHEXIDINE GLUCONATE 1 APPLICATION(S): 213 SOLUTION TOPICAL at 22:23

## 2024-02-26 RX ADMIN — Medication 30 MILLILITER(S): at 22:54

## 2024-02-26 RX ADMIN — FAMOTIDINE 20 MILLIGRAM(S): 10 INJECTION INTRAVENOUS at 22:54

## 2024-02-26 NOTE — OB PROVIDER TRIAGE NOTE - HISTORY OF PRESENT ILLNESS
22 y/o , EDC 3/22, GS 36.3 weeks, presents for contractions since 17:00, every 3-5 minutes, 10/10 in intensity on numeric pain scale. Denies VB/ LOF. Reports good FM.  Pregnancy is complicated by breech fetus. Pt is scheduled for C/S on 3/15.   Last PO 20:00    AP Course:  -breech pregnancy  -subchorionic hematoma in 1st trimester, resolved  -hyperemesis gravidarum in 1st and 2nd trimester, resolved  -anemia, on iron     GBS: Negative

## 2024-02-26 NOTE — OB PROVIDER H&P - ASSESSMENT
22 y/o , EDC 3/22, GS 36.3 weeks, evidence of early labor.  - Admit to L&D for unscheduled C/S  - Huddle called at 21:15 with Dr. Salmon, Anesthesia, Resident, and charge nurse  - NPO  - EFM/TOCO  - IV access, CBC/T&C/RPR  - Pepcid and Bicitra as per pre-op policy  - Anesthesia consult   - Consents discussed and obtained by Dr. Salmon  - Plan to move to OR as soon as possible  - Discussed with Dr. Salmon and Dr. Garcia, PGY-3    RAFI Bonner, PAC

## 2024-02-26 NOTE — OB RN DELIVERY SUMMARY - NS_SEPSISRSKCALC_OBGYN_ALL_OB_FT
No temperature has been documented for this patient in CPN or on the OB Flowsheet. Ensure the highest temperature during labor was documented on the OB Flowsheet.  No gestational age at birth has been documented. Ensure delivery date/time has been entered above.  Rupture of membranes must be entered above.   EOS calculated successfully. EOS Risk Factor: 0.5/1000 live births ([facility] national incidence); GA=36w3d; Temp=98.42; ROM=0.017; GBS='Negative'; Antibiotics='No antibiotics or any antibiotics < 2 hrs prior to birth'

## 2024-02-26 NOTE — OB PROVIDER TRIAGE NOTE - CURRENT PREGNANCY COMPLICATIONS, OB PROFILE
Hospitalist Progress Note      PCP: MATEUSZ Murphy - CNP    Date of Admission: 10/10/2022    Chief Complaint: L shoulder pain      Subjective:   L shoulder pain persistent but improving. LUE mild wrist swelling  Denies new acute complaints. Medications:  Reviewed    Infusion Medications    sodium chloride      dextrose       Scheduled Medications    naproxen  500 mg Oral BID WC    rivaroxaban  15 mg Oral Dinner    acetaminophen  650 mg Oral TID    atorvastatin  80 mg Oral Nightly    furosemide  20 mg Oral Daily    hydrocortisone   Rectal BID    insulin lispro  15 Units SubCUTAneous TID     insulin glargine  48 Units SubCUTAneous Daily    levothyroxine  150 mcg Oral Daily    metoprolol tartrate  25 mg Oral BID    sacubitril-valsartan  0.5 tablet Oral BID    sodium chloride flush  5-40 mL IntraVENous 2 times per day     PRN Meds: HYDROcodone 5 mg - acetaminophen, cyclobenzaprine, sodium chloride flush, sodium chloride, ondansetron **OR** ondansetron, polyethylene glycol, acetaminophen **OR** acetaminophen, glucose, dextrose bolus **OR** dextrose bolus, glucagon (rDNA), dextrose    No intake or output data in the 24 hours ending 10/12/22 1320      Exam:    /74   Pulse 74   Temp 98.3 °F (36.8 °C) (Oral)   Resp 18   Ht 5' (1.524 m)   Wt 248 lb (112.5 kg)   LMP  (LMP Unknown)   SpO2 95%   BMI 48.43 kg/m²     Gen/overall appearance: Not in acute distress. Alert. Head: Normocephalic, atraumatic  Eyes: EOMI, no scleral icterus  CVS: regular rate and rhythm, Normal S1S2  Pulm: Clear to auscultation bilaterally. No crackles/wheezes  Extremities: No edema.  No erythema or warmth  Neuro: No gross focal deficits noted  Skin: Warm, dry    Labs:   Recent Labs     10/10/22  1120 10/11/22  0509 10/12/22  0904   WBC 8.5 10.0 11.4*   HGB 7.9* 7.5* 7.3*   HCT 23.8* 22.8* 22.3*   PLT 91* 87* 93*       Recent Labs     10/10/22  1120 10/11/22  0509 10/12/22  0903    137 132*   K 4.1 4.5 4.5   CL 99 99 96*   CO2 27 28 25   BUN 31* 30* 31*   CREATININE 0.8 1.2 1.1   CALCIUM 9.1 8.6 8.8       Recent Labs     10/10/22  1120   AST 15   ALT 11   BILITOT 0.4   ALKPHOS 75       No results for input(s): INR in the last 72 hours. Recent Labs     10/10/22  1120 10/10/22  1613 10/11/22  0509   CKTOTAL  --  55  --    TROPONINI 0.04* 0.04* 0.05*         Assessment/Plan:    Active Hospital Problems    Diagnosis Date Noted    Shoulder pain [M25.519] 10/10/2022     Priority: Medium       Acute on chronic L shoulder pain  Hx of chronic bilat rotator cuff tears  Severe chronic bilateral low back muscle spasms  - pain management  - trial of naproxen and opiates  - ortho following  - PTOT  - palliative eval     Recently diagnosed MDS  - trend CBC  - transfuse as needed  - close outpt follow up with hematology     PMH CAD s/p CABG, compensated chronic dCHF, PAF, morbid obesity, htn, hld    DVT Prophylaxis: xarelto  Diet: ADULT DIET;  Regular; 5 carb choices (75 gm/meal)  Code Status: Full Code      Trenton Mccormick MD Breech, s/p subchorionic hematoma & hyperemesis/Other

## 2024-02-26 NOTE — OB RN INTRAOPERATIVE NOTE - NSSELHIDDEN_OBGYN_ALL_OB_FT
[NS_DeliveryAttending1_OBGYN_ALL_OB_FT:[email or web address]=],[NS_DeliveryRN_OBGYN_ALL_OB_FT:[id number]=] [NS_DeliveryAttending1_OBGYN_ALL_OB_FT:[email or web address]=],[NS_DeliveryRN_OBGYN_ALL_OB_FT:[id number]=],[NS_DeliveryAssist1_OBGYN_ALL_OB_FT:[id number]=]

## 2024-02-26 NOTE — OB PROVIDER TRIAGE NOTE - NSOBPROVIDERNOTE_OBGYN_ALL_OB_FT
24 y/o , EDC 3/22, GS 36.3 weeks, evidence of early labor.  - Admit to L&D for unscheduled C/S  - Huddle called at 21:15 with Dr. Salmon, Anesthesia, Resident, and charge nurse  - NPO  - EFM/TOCO  - IV access, CBC/T&C/RPR  - Pepcid and Bicitra as per pre-op policy  - Anesthesia consult   - Consents discussed and obtained by Dr. Salmon  - Plan to move to OR as soon as possible  - Discussed with Dr. Luis Antonio Bonner, PAC

## 2024-02-26 NOTE — OB RN PATIENT PROFILE - FALL HARM RISK - UNIVERSAL INTERVENTIONS
Bed in lowest position, wheels locked, appropriate side rails in place/Call bell, personal items and telephone in reach/Instruct patient to call for assistance before getting out of bed or chair/Non-slip footwear when patient is out of bed/[place] to call system/Physically safe environment - no spills, clutter or unnecessary equipment/Purposeful Proactive Rounding/Room/bathroom lighting operational, light cord in reach

## 2024-02-26 NOTE — OB RN DELIVERY SUMMARY - NSSELHIDDEN_OBGYN_ALL_OB_FT
[NS_DeliveryAttending1_OBGYN_ALL_OB_FT:[email or web address]=],[NS_DeliveryRN_OBGYN_ALL_OB_FT:[id number]=] [NS_DeliveryAttending1_OBGYN_ALL_OB_FT:[email or web address]=],[NS_DeliveryRN_OBGYN_ALL_OB_FT:[id number]=],[NS_DeliveryAssist1_OBGYN_ALL_OB_FT:[id number]=]

## 2024-02-26 NOTE — OB PROVIDER H&P - NS_VBACATTEMPT_OBGYN_ALL_OB
N/A Stelara Counseling:  I discussed with the patient the risks of ustekinumab including but not limited to immunosuppression, malignancy, posterior leukoencephalopathy syndrome, and serious infections.  The patient understands that monitoring is required including a PPD at baseline and must alert us or the primary physician if symptoms of infection or other concerning signs are noted.

## 2024-02-26 NOTE — OB RN DELIVERY SUMMARY - NS_GENERALBABYACOMMENTA_OBGYN_ALL_OB_FT
mother in OR. skin to skin as soon as pt arrives in pacu mother in OR. skin to skin performed as soon as pt arrives in pacu

## 2024-02-26 NOTE — OB PROVIDER H&P - NSLOWPPHRISK_OBGYN_A_OB
No previous uterine incision/Lala Pregnancy/Less than or equal to 4 previous vaginal births/No known bleeding disorder/No history of postpartum hemorrhage/No other PPH risks indicated

## 2024-02-26 NOTE — OB PROVIDER TRIAGE NOTE - NSHPPHYSICALEXAM_GEN_ALL_CORE
Vital Signs Last 24 Hrs  T(C): 36.5 (26 Feb 2024 20:40), Max: 36.5 (26 Feb 2024 19:14)  T(F): 97.7 (26 Feb 2024 20:40), Max: 97.7 (26 Feb 2024 19:14)  HR: 71 (26 Feb 2024 20:42) (71 - 85)  BP: 116/69 (26 Feb 2024 20:42) (111/65 - 116/69)  BP(mean): --  RR: 16 (26 Feb 2024 19:14) (16 - 16)  SpO2: --    Gen: A/O x3  Abd: Gravid uterus, toco in place   TAS: breech, head to maternal RUQ, anterior placenta,  bpm in m-mode, images saved in ASOB  FHR: 140 baseline, moderate variability, with accelerations, reactive  CTX: regular, q3-6 min   SVE: 2.5/60/-3  EFW: 3000

## 2024-02-27 LAB
HCT VFR BLD CALC: 29.1 % — LOW (ref 34.5–45)
HGB BLD-MCNC: 10.3 G/DL — LOW (ref 11.5–15.5)
MCHC RBC-ENTMCNC: 32 PG — SIGNIFICANT CHANGE UP (ref 27–34)
MCHC RBC-ENTMCNC: 35.4 GM/DL — SIGNIFICANT CHANGE UP (ref 32–36)
MCV RBC AUTO: 90.4 FL — SIGNIFICANT CHANGE UP (ref 80–100)
NRBC # BLD: 0 /100 WBCS — SIGNIFICANT CHANGE UP (ref 0–0)
NRBC # FLD: 0 K/UL — SIGNIFICANT CHANGE UP (ref 0–0)
PLATELET # BLD AUTO: 194 K/UL — SIGNIFICANT CHANGE UP (ref 150–400)
RBC # BLD: 3.22 M/UL — LOW (ref 3.8–5.2)
RBC # FLD: 13.2 % — SIGNIFICANT CHANGE UP (ref 10.3–14.5)
T PALLIDUM AB TITR SER: NEGATIVE — SIGNIFICANT CHANGE UP
WBC # BLD: 19.34 K/UL — HIGH (ref 3.8–10.5)
WBC # FLD AUTO: 19.34 K/UL — HIGH (ref 3.8–10.5)

## 2024-02-27 RX ORDER — OXYTOCIN 10 UNIT/ML
333.33 VIAL (ML) INJECTION
Qty: 20 | Refills: 0 | Status: DISCONTINUED | OUTPATIENT
Start: 2024-02-27 | End: 2024-02-27

## 2024-02-27 RX ORDER — ACETAMINOPHEN 500 MG
1000 TABLET ORAL ONCE
Refills: 0 | Status: COMPLETED | OUTPATIENT
Start: 2024-02-27 | End: 2024-02-27

## 2024-02-27 RX ORDER — OXYCODONE HYDROCHLORIDE 5 MG/1
5 TABLET ORAL
Refills: 0 | Status: DISCONTINUED | OUTPATIENT
Start: 2024-02-27 | End: 2024-02-29

## 2024-02-27 RX ORDER — SODIUM CHLORIDE 9 MG/ML
1000 INJECTION, SOLUTION INTRAVENOUS
Refills: 0 | Status: DISCONTINUED | OUTPATIENT
Start: 2024-02-27 | End: 2024-02-27

## 2024-02-27 RX ORDER — SENNA PLUS 8.6 MG/1
2 TABLET ORAL
Refills: 0 | Status: DISCONTINUED | OUTPATIENT
Start: 2024-02-27 | End: 2024-02-29

## 2024-02-27 RX ORDER — PIPERACILLIN AND TAZOBACTAM 4; .5 G/20ML; G/20ML
4.5 INJECTION, POWDER, LYOPHILIZED, FOR SOLUTION INTRAVENOUS EVERY 8 HOURS
Refills: 0 | Status: DISCONTINUED | OUTPATIENT
Start: 2024-02-27 | End: 2024-02-28

## 2024-02-27 RX ADMIN — Medication 400 MILLIGRAM(S): at 02:50

## 2024-02-27 RX ADMIN — SENNA PLUS 2 TABLET(S): 8.6 TABLET ORAL at 17:46

## 2024-02-27 RX ADMIN — Medication 975 MILLIGRAM(S): at 09:10

## 2024-02-27 RX ADMIN — Medication 975 MILLIGRAM(S): at 20:44

## 2024-02-27 RX ADMIN — Medication 30 MILLIGRAM(S): at 11:49

## 2024-02-27 RX ADMIN — HEPARIN SODIUM 5000 UNIT(S): 5000 INJECTION INTRAVENOUS; SUBCUTANEOUS at 17:46

## 2024-02-27 RX ADMIN — MAGNESIUM HYDROXIDE 30 MILLILITER(S): 400 TABLET, CHEWABLE ORAL at 17:46

## 2024-02-27 RX ADMIN — Medication 30 MILLIGRAM(S): at 17:46

## 2024-02-27 RX ADMIN — SIMETHICONE 80 MILLIGRAM(S): 80 TABLET, CHEWABLE ORAL at 08:36

## 2024-02-27 RX ADMIN — HEPARIN SODIUM 5000 UNIT(S): 5000 INJECTION INTRAVENOUS; SUBCUTANEOUS at 05:36

## 2024-02-27 RX ADMIN — SIMETHICONE 80 MILLIGRAM(S): 80 TABLET, CHEWABLE ORAL at 14:39

## 2024-02-27 RX ADMIN — Medication 30 MILLIGRAM(S): at 05:36

## 2024-02-27 RX ADMIN — OXYCODONE HYDROCHLORIDE 5 MILLIGRAM(S): 5 TABLET ORAL at 06:56

## 2024-02-27 RX ADMIN — Medication 30 MILLIGRAM(S): at 18:37

## 2024-02-27 RX ADMIN — Medication 975 MILLIGRAM(S): at 15:10

## 2024-02-27 RX ADMIN — Medication 30 MILLIGRAM(S): at 12:34

## 2024-02-27 RX ADMIN — Medication 30 MILLIGRAM(S): at 23:50

## 2024-02-27 RX ADMIN — Medication 975 MILLIGRAM(S): at 08:36

## 2024-02-27 RX ADMIN — PIPERACILLIN AND TAZOBACTAM 200 GRAM(S): 4; .5 INJECTION, POWDER, LYOPHILIZED, FOR SOLUTION INTRAVENOUS at 15:29

## 2024-02-27 RX ADMIN — Medication 30 MILLIGRAM(S): at 06:06

## 2024-02-27 RX ADMIN — PIPERACILLIN AND TAZOBACTAM 200 GRAM(S): 4; .5 INJECTION, POWDER, LYOPHILIZED, FOR SOLUTION INTRAVENOUS at 23:45

## 2024-02-27 RX ADMIN — Medication 1 APPLICATORFUL: at 22:31

## 2024-02-27 RX ADMIN — Medication 975 MILLIGRAM(S): at 14:39

## 2024-02-27 RX ADMIN — Medication 975 MILLIGRAM(S): at 20:14

## 2024-02-27 NOTE — DISCHARGE NOTE OB - CARE PROVIDER_API CALL
Emely Salmon  Obstetrics and Gynecology  [address] [place] [id number]  Phone: [phone]  Fax: [phone]  Follow Up Time: 2 weeks

## 2024-02-27 NOTE — CHART NOTE - NSCHARTNOTEFT_GEN_A_CORE
Evaluated at bedside due to complaints of chest tightness.  Upon evaluation patient is stable.  Reports symptoms have improved after using incentive spirometer.  Patient states she has just started using the incentive spirometer a few moments ago.  Vitals stable.    Mala MARTINEZ

## 2024-02-27 NOTE — DISCHARGE NOTE OB - PATIENT PORTAL LINK FT
You can access the FollowMyHealth Patient Portal offered by [facility] by registering at the following website: [email or web address]. By joining [employer]s FollowMyHealth portal, you will also be able to view your health information using other applications (apps) compatible with our system.

## 2024-02-27 NOTE — OB PROVIDER DELIVERY SUMMARY - NSPROVIDERDELIVERYNOTE_OBGYN_ALL_OB_FT
primary LTCS at 36w3d 2/2 breech presentation in early labor, uncomplicated  viable male infant, breech presentation, weight 7#7, Apgars 8/9, cord gasses sent  Grossly normal fallopian tubes, uterus, and ovaries    QBL: 517   IVF: 1300  UOP: 200    VTimmel PGY2  W/ Dr. Salmon primary LTCS at 36w3d 2/2 breech presentation in early labor, uncomplicated  viable male infant, bob breech presentation, weight 7#7, Apgars 8/9, cord gasses sent  Grossly normal fallopian tubes, uterus, and ovaries    QBL: 517   IVF: 1300  UOP: 200    Dictation #[id number]    VTfranci PGY2  W/ Dr. Salmon

## 2024-02-27 NOTE — DISCHARGE NOTE OB - NS MD DC FALL RISK RISK
For information on Fall & Injury Prevention, visit: [email or web address] OR  [email or web address] OR  https://www.cdc.gov/steadi/patient.html

## 2024-02-27 NOTE — OB PROVIDER DELIVERY SUMMARY - NSSELHIDDEN_OBGYN_ALL_OB_FT
[NS_DeliveryAttending1_OBGYN_ALL_OB_FT:[email or web address]=],[NS_DeliveryRN_OBGYN_ALL_OB_FT:[id number]=],[NS_DeliveryAssist1_OBGYN_ALL_OB_FT:[id number]=]

## 2024-02-27 NOTE — DISCHARGE NOTE OB - MEDICATION SUMMARY - MEDICATIONS TO TAKE
I will START or STAY ON the medications listed below when I get home from the hospital:    ibuprofen 600 mg oral tablet  -- 1 tab(s) by mouth every 6 hours as needed for  moderate pain  -- Indication: For PP pain control    acetaminophen 325 mg oral tablet  -- 3 tab(s) by mouth every 6 hours as needed for  moderate pain  -- Indication: For PP pain control    PNV Prenatal oral tablet  -- 1 tab(s) by mouth once a day  -- Indication: For Breast feeding    ferrous sulfate 325 mg (65 mg elemental iron) oral tablet  -- 1 tab(s) by mouth 2 times a day  -- Indication: For Acute blood loss    ascorbic acid 500 mg oral tablet  -- 1 tab(s) by mouth once a day  -- Indication: For Acute blood loss

## 2024-02-27 NOTE — OB PROVIDER DELIVERY SUMMARY - NSOBVTERISKREFER_OBGYN_ALL_OB
Refer to the Assessment tab to view/cancel completed assessment. PAST MEDICAL HISTORY:  CVA (cerebral vascular accident)

## 2024-02-27 NOTE — DISCHARGE NOTE OB - CARE PROVIDERS DIRECT ADDRESSES
[email or web address]

## 2024-02-27 NOTE — OB NEONATOLOGY/PEDIATRICIAN DELIVERY SUMMARY - NSPEDSNEONOTESA_OBGYN_ALL_OB_FT
Pediatrician called to delivery for breech delivery.  LGA male infant born at 36 + 3 wks via scheduled primary  to a 23 y/o  blood type O + mother. No significant maternal or prenatal history. Prenatal labs nr/immune/-, GBS - on . AROM at delivery with clear fluids. EOS score 0.06, highest maternal temperature 36.9 C. Delivery significant for nuchal x2, reduced. Baby emerged vigorous, crying. Cord clamping delayed 60 sec. Infant was brought to radiant warmer and warmed, dried, stimulated and suctioned. HR>100, normal respiratory effort. APGARS of 8/9. Void x2 in delivery room. Mom is initiating breast feeding. Consents to Hepatitis B vaccination. Unknown if desires for infant to be circumcised. Admitted under Dr. Bailey.    BW: 3360g (LGA)  : 24  TOB: 2350    Physical exam:  Gen: NAD; well-appearing  HEENT: NC/AT; AFOF; ears and nose normally set; no tags ; oropharynx clear  Skin: pink, warm, well-perfused, no rash  Resp: CTAB, even, non-labored breathing  Cardiac: RRR, normal S1 and S2; no murmurs  Abd: soft, NT/ND; +BS; no HSM; umbilicus 3 vessels  Extremities: FROM; no crepitus; Hips: negative O/B  : Rik I male; testicles descended b/l; no abnormalities; no hernia; anus patent  Neuro: +krista, suck, grasp, Babinski; good tone throughout

## 2024-02-28 LAB
BASOPHILS # BLD AUTO: 0.05 K/UL — SIGNIFICANT CHANGE UP (ref 0–0.2)
BASOPHILS NFR BLD AUTO: 0.3 % — SIGNIFICANT CHANGE UP (ref 0–2)
EOSINOPHIL # BLD AUTO: 0.11 K/UL — SIGNIFICANT CHANGE UP (ref 0–0.5)
EOSINOPHIL NFR BLD AUTO: 0.7 % — SIGNIFICANT CHANGE UP (ref 0–6)
HCT VFR BLD CALC: 30.6 % — LOW (ref 34.5–45)
HGB BLD-MCNC: 10.4 G/DL — LOW (ref 11.5–15.5)
IANC: 12.87 K/UL — HIGH (ref 1.8–7.4)
IMM GRANULOCYTES NFR BLD AUTO: 1.1 % — HIGH (ref 0–0.9)
LYMPHOCYTES # BLD AUTO: 12.8 % — LOW (ref 13–44)
LYMPHOCYTES # BLD AUTO: 2.13 K/UL — SIGNIFICANT CHANGE UP (ref 1–3.3)
MCHC RBC-ENTMCNC: 31 PG — SIGNIFICANT CHANGE UP (ref 27–34)
MCHC RBC-ENTMCNC: 34 GM/DL — SIGNIFICANT CHANGE UP (ref 32–36)
MCV RBC AUTO: 91.1 FL — SIGNIFICANT CHANGE UP (ref 80–100)
MONOCYTES # BLD AUTO: 1.32 K/UL — HIGH (ref 0–0.9)
MONOCYTES NFR BLD AUTO: 7.9 % — SIGNIFICANT CHANGE UP (ref 2–14)
NEUTROPHILS # BLD AUTO: 12.87 K/UL — HIGH (ref 1.8–7.4)
NEUTROPHILS NFR BLD AUTO: 77.2 % — HIGH (ref 43–77)
NRBC # BLD: 0 /100 WBCS — SIGNIFICANT CHANGE UP (ref 0–0)
NRBC # FLD: 0 K/UL — SIGNIFICANT CHANGE UP (ref 0–0)
PLATELET # BLD AUTO: 227 K/UL — SIGNIFICANT CHANGE UP (ref 150–400)
RBC # BLD: 3.36 M/UL — LOW (ref 3.8–5.2)
RBC # FLD: 13.6 % — SIGNIFICANT CHANGE UP (ref 10.3–14.5)
WBC # BLD: 16.66 K/UL — HIGH (ref 3.8–10.5)
WBC # FLD AUTO: 16.66 K/UL — HIGH (ref 3.8–10.5)

## 2024-02-28 RX ORDER — IBUPROFEN 200 MG
600 TABLET ORAL EVERY 6 HOURS
Refills: 0 | Status: DISCONTINUED | OUTPATIENT
Start: 2024-02-28 | End: 2024-02-29

## 2024-02-28 RX ORDER — PIPERACILLIN AND TAZOBACTAM 4; .5 G/20ML; G/20ML
4.5 INJECTION, POWDER, LYOPHILIZED, FOR SOLUTION INTRAVENOUS ONCE
Refills: 0 | Status: DISCONTINUED | OUTPATIENT
Start: 2024-02-28 | End: 2024-02-28

## 2024-02-28 RX ORDER — FERROUS SULFATE 325(65) MG
325 TABLET ORAL
Refills: 0 | Status: DISCONTINUED | OUTPATIENT
Start: 2024-02-28 | End: 2024-02-29

## 2024-02-28 RX ORDER — ASCORBIC ACID 60 MG
500 TABLET,CHEWABLE ORAL DAILY
Refills: 0 | Status: DISCONTINUED | OUTPATIENT
Start: 2024-02-28 | End: 2024-02-29

## 2024-02-28 RX ADMIN — Medication 975 MILLIGRAM(S): at 02:51

## 2024-02-28 RX ADMIN — Medication 600 MILLIGRAM(S): at 14:00

## 2024-02-28 RX ADMIN — HEPARIN SODIUM 5000 UNIT(S): 5000 INJECTION INTRAVENOUS; SUBCUTANEOUS at 05:49

## 2024-02-28 RX ADMIN — HEPARIN SODIUM 5000 UNIT(S): 5000 INJECTION INTRAVENOUS; SUBCUTANEOUS at 18:11

## 2024-02-28 RX ADMIN — Medication 600 MILLIGRAM(S): at 13:37

## 2024-02-28 RX ADMIN — SENNA PLUS 2 TABLET(S): 8.6 TABLET ORAL at 05:49

## 2024-02-28 RX ADMIN — Medication 975 MILLIGRAM(S): at 21:55

## 2024-02-28 RX ADMIN — Medication 975 MILLIGRAM(S): at 08:42

## 2024-02-28 RX ADMIN — PIPERACILLIN AND TAZOBACTAM 200 GRAM(S): 4; .5 INJECTION, POWDER, LYOPHILIZED, FOR SOLUTION INTRAVENOUS at 08:42

## 2024-02-28 RX ADMIN — Medication 600 MILLIGRAM(S): at 05:49

## 2024-02-28 RX ADMIN — Medication 600 MILLIGRAM(S): at 18:11

## 2024-02-28 RX ADMIN — Medication 975 MILLIGRAM(S): at 21:25

## 2024-02-28 RX ADMIN — Medication 30 MILLIGRAM(S): at 00:20

## 2024-02-28 RX ADMIN — Medication 975 MILLIGRAM(S): at 09:15

## 2024-02-28 RX ADMIN — Medication 975 MILLIGRAM(S): at 02:21

## 2024-02-28 RX ADMIN — Medication 600 MILLIGRAM(S): at 18:46

## 2024-02-28 RX ADMIN — Medication 600 MILLIGRAM(S): at 06:19

## 2024-02-28 NOTE — PROGRESS NOTE ADULT - ASSESSMENT
Patient seen at bedside resting comfortably offers no new complaints. + Ambulation, + void without difficulty, + flatus;  no bm; tolerating regular diet. both breastfeeding and bottle feeding. Denies HA, blurry vision or epigastric pain, CP, SOB, N/V/D,  dizziness, palpitations, worsening vaginal bleeding.    Vital Signs Last 24 Hrs  T(C): 36.6 (28 Feb 2024 10:00), Max: 37.3 (27 Feb 2024 10:32)  T(F): 97.9 (28 Feb 2024 10:00), Max: 99.1 (27 Feb 2024 10:32)  HR: 98 (28 Feb 2024 10:00) (88 - 104)  BP: 109/60 (28 Feb 2024 10:00) (103/53 - 130/84)  BP(mean): --  RR: 17 (28 Feb 2024 10:00) (16 - 18)  SpO2: 100% (28 Feb 2024 10:00) (98% - 100%)    Parameters below as of 28 Feb 2024 10:00  Patient On (Oxygen Delivery Method): room air        Gen: A&O x 3, NAD  Chest: CTABL  Cardiac: S1, S2, RRR  Breast: Soft, nontender, nonengorged  Abdomen: +BS; soft; Nontender, nondistended, Incision C/D/I steri strips in place   Gyn: Minimal lochia  Extremities: Nontender, DTRS 2+, no worsening edema                          10.4   16.66 )-----------( 227      ( 28 Feb 2024 06:29 )             30.6       A/P: 23yr old F POD #2 s/p p/c/s on 2/27/2024 for PTL/Breech presentation c/b Endometritis     #Endometritis   - Uterine tenderness + WBC 19  - WBC trending down to 16.66  - Zosyn x24hrs   - Pt afebrile   - AM CBC     # PP  - Pain management as needed  - cont post op care  - OOB and ambulate  - Incision C/D/I   - Incision care discussed   - Encourage breastfeeding   - Discharge planning     -d/w dr Luis Antonio Moon NP

## 2024-02-29 VITALS
OXYGEN SATURATION: 97 % | RESPIRATION RATE: 16 BRPM | DIASTOLIC BLOOD PRESSURE: 66 MMHG | SYSTOLIC BLOOD PRESSURE: 116 MMHG | HEART RATE: 93 BPM | TEMPERATURE: 98 F

## 2024-02-29 LAB
BASOPHILS # BLD AUTO: 0.05 K/UL — SIGNIFICANT CHANGE UP (ref 0–0.2)
BASOPHILS NFR BLD AUTO: 0.3 % — SIGNIFICANT CHANGE UP (ref 0–2)
EOSINOPHIL # BLD AUTO: 0.33 K/UL — SIGNIFICANT CHANGE UP (ref 0–0.5)
EOSINOPHIL NFR BLD AUTO: 2.1 % — SIGNIFICANT CHANGE UP (ref 0–6)
HCT VFR BLD CALC: 27.3 % — LOW (ref 34.5–45)
HGB BLD-MCNC: 9.4 G/DL — LOW (ref 11.5–15.5)
IANC: 11.76 K/UL — HIGH (ref 1.8–7.4)
IMM GRANULOCYTES NFR BLD AUTO: 1.4 % — HIGH (ref 0–0.9)
LYMPHOCYTES # BLD AUTO: 14.2 % — SIGNIFICANT CHANGE UP (ref 13–44)
LYMPHOCYTES # BLD AUTO: 2.23 K/UL — SIGNIFICANT CHANGE UP (ref 1–3.3)
MCHC RBC-ENTMCNC: 31.9 PG — SIGNIFICANT CHANGE UP (ref 27–34)
MCHC RBC-ENTMCNC: 34.4 GM/DL — SIGNIFICANT CHANGE UP (ref 32–36)
MCV RBC AUTO: 92.5 FL — SIGNIFICANT CHANGE UP (ref 80–100)
MONOCYTES # BLD AUTO: 1.06 K/UL — HIGH (ref 0–0.9)
MONOCYTES NFR BLD AUTO: 6.8 % — SIGNIFICANT CHANGE UP (ref 2–14)
NEUTROPHILS # BLD AUTO: 11.76 K/UL — HIGH (ref 1.8–7.4)
NEUTROPHILS NFR BLD AUTO: 75.2 % — SIGNIFICANT CHANGE UP (ref 43–77)
NRBC # BLD: 0 /100 WBCS — SIGNIFICANT CHANGE UP (ref 0–0)
NRBC # FLD: 0 K/UL — SIGNIFICANT CHANGE UP (ref 0–0)
PLATELET # BLD AUTO: 220 K/UL — SIGNIFICANT CHANGE UP (ref 150–400)
RBC # BLD: 2.95 M/UL — LOW (ref 3.8–5.2)
RBC # FLD: 13.4 % — SIGNIFICANT CHANGE UP (ref 10.3–14.5)
WBC # BLD: 15.65 K/UL — HIGH (ref 3.8–10.5)
WBC # FLD AUTO: 15.65 K/UL — HIGH (ref 3.8–10.5)

## 2024-02-29 PROCEDURE — 93010 ELECTROCARDIOGRAM REPORT: CPT

## 2024-02-29 PROCEDURE — 71045 X-RAY EXAM CHEST 1 VIEW: CPT | Mod: 26

## 2024-02-29 RX ORDER — ASCORBIC ACID 60 MG
1 TABLET,CHEWABLE ORAL
Qty: 0 | Refills: 0 | DISCHARGE
Start: 2024-02-29

## 2024-02-29 RX ORDER — ACETAMINOPHEN 500 MG
3 TABLET ORAL
Qty: 0 | Refills: 0 | DISCHARGE
Start: 2024-02-29

## 2024-02-29 RX ORDER — FAMOTIDINE 10 MG/ML
20 INJECTION INTRAVENOUS ONCE
Refills: 0 | Status: DISCONTINUED | OUTPATIENT
Start: 2024-02-29 | End: 2024-02-29

## 2024-02-29 RX ORDER — IBUPROFEN 200 MG
1 TABLET ORAL
Qty: 0 | Refills: 0 | DISCHARGE
Start: 2024-02-29

## 2024-02-29 RX ORDER — FERROUS SULFATE 325(65) MG
1 TABLET ORAL
Qty: 0 | Refills: 0 | DISCHARGE
Start: 2024-02-29

## 2024-02-29 RX ORDER — FERROUS SULFATE 325(65) MG
0 TABLET ORAL
Refills: 0 | DISCHARGE

## 2024-02-29 RX ADMIN — SIMETHICONE 80 MILLIGRAM(S): 80 TABLET, CHEWABLE ORAL at 08:31

## 2024-02-29 RX ADMIN — Medication 600 MILLIGRAM(S): at 16:03

## 2024-02-29 RX ADMIN — Medication 975 MILLIGRAM(S): at 03:37

## 2024-02-29 RX ADMIN — Medication 325 MILLIGRAM(S): at 16:05

## 2024-02-29 RX ADMIN — Medication 500 MILLIGRAM(S): at 16:05

## 2024-02-29 RX ADMIN — Medication 975 MILLIGRAM(S): at 04:07

## 2024-02-29 RX ADMIN — HEPARIN SODIUM 5000 UNIT(S): 5000 INJECTION INTRAVENOUS; SUBCUTANEOUS at 07:02

## 2024-02-29 RX ADMIN — Medication 600 MILLIGRAM(S): at 00:53

## 2024-02-29 RX ADMIN — Medication 600 MILLIGRAM(S): at 07:02

## 2024-02-29 RX ADMIN — Medication 600 MILLIGRAM(S): at 17:00

## 2024-02-29 RX ADMIN — Medication 975 MILLIGRAM(S): at 12:50

## 2024-02-29 RX ADMIN — Medication 600 MILLIGRAM(S): at 00:23

## 2024-02-29 RX ADMIN — Medication 975 MILLIGRAM(S): at 11:57

## 2024-02-29 RX ADMIN — Medication 1 APPLICATORFUL: at 07:12

## 2024-02-29 NOTE — PROVIDER CONTACT NOTE (OTHER) - ACTION/TREATMENT ORDERED:
Renny Candelaria is aware of pts status. Will round on pt at bedside. CT scan ordered.
as per MD Kuo STAT EKG to be preformed

## 2024-02-29 NOTE — PROGRESS NOTE ADULT - SUBJECTIVE AND OBJECTIVE BOX
Late entry due to patient care:  NP Note: I saw patient at the bedside at 830am, Patient seen at bedside resting comfortably. Sleeping but arousable Reporting inspiratory chest pain, and "rattling" in her chest.  + Ambulation, + void without difficulty, + flatus;  + bm;  tolerating regular diet. both breastfeeding and bottle feeding. Bonding well w / Denies HA, blurry vision or epigastric pain, CP, SOB, N/V/D,  dizziness, palpitations, worsening vaginal bleeding.     Vital Signs Last 24 Hrs  T(C): 36.6 (2024 10:27), Max: 36.9 (2024 13:41)  T(F): 97.8 (2024 10:27), Max: 98.4 (2024 13:41)  HR: 96 (2024 10:27) (90 - 106)  BP: 108/62 (2024 10:27) (106/62 - 122/74)  BP(mean): --  RR: 17 (2024 10:27) (16 - 18)  SpO2: 100% (2024 10:27) (98% - 100%)    Parameters below as of 2024 10:27  Patient On (Oxygen Delivery Method): room air        Gen: A&O x 3, NAD  Chest: CTA B/L  Cardiac: S1,S2  RRR  Breast: Soft, nontender, nonengorged  Abdomen: +BS; soft; appropriately tender, no fundal tenderness nondistended, Incision C/D/I Dermabond in place  Gyn: Minimal lochia  Extremities: Nontender, DTRS 2+, no worsening edema                          9.4    15.65 )-----------( 220      ( 2024 06:08 )             27.3       A/P: POD #3 s/p c/s s/p zosyn for endometritis    - EKG done x1, Normal Sinus Rhythm, Reviewed with DR Salmon  - PT appears in no apparant distress  - cont PO analgesia PRN  - inc PO hydration, ambulation encouraged   - Chest XRAY pending  - Simethicone PRN  - encouraged incentive spirometer  - Lactation support /education reviewed   - cont PNV, Iron, Colace As directed   -d/w dr Luis Antonio Castro [facility]  [phone]
Post-Operative Note, C/S  She is a  23y woman who is now post-operative day: 1    Subjective:  The patient feels well.  She is ambulating.   She is tolerating regular diet.  She denies nausea and vomiting; denies fever.  She is voiding.  Patient complaining of uterine tenderness.   She reports normal postpartum bleeding.  Denies chest tightness. States it has improved with use of incentive spirometry.     Physical exam:    Vital Signs Last 24 Hrs  T(C): 36.8 (27 Feb 2024 14:26), Max: 37.3 (27 Feb 2024 10:32)  T(F): 98.2 (27 Feb 2024 14:26), Max: 99.1 (27 Feb 2024 10:32)  HR: 95 (27 Feb 2024 14:26) (71 - 102)  BP: 119/65 (27 Feb 2024 14:26) (102/75 - 151/118)  BP(mean): 79 (27 Feb 2024 03:00) (76 - 82)  RR: 17 (27 Feb 2024 14:26) (14 - 20)  SpO2: 100% (27 Feb 2024 14:26) (98% - 100%)    Parameters below as of 27 Feb 2024 14:26  Patient On (Oxygen Delivery Method): room air        Gen: NAD  Breast: Soft, nontender, not engorged.  Abdomen: Soft, + tenderness no distension , firm uterine fundus at umbilicus.  Incision: C/D/I.  Pelvic: Normal lochia noted  Ext: No calf tenderness    LABS:                        10.3   19.34 )-----------( 194      ( 27 Feb 2024 06:00 )             29.1     ABO Interpretation: O (02-26 @ 21:53)  Rh Interpretation: Positive (02-26 @ 21:53)  Antibody Screen: Negative (02-26 @ 21:53)  ABO Interpretation: O (02-26 @ 21:53)  Rh Interpretation: Positive (02-26 @ 21:53)    Rubella status:     Allergies    No Known Allergies    Intolerances      MEDICATIONS  (STANDING):  acetaminophen     Tablet .. 975 milliGRAM(s) Oral <User Schedule>  clotrimazole 2% Vaginal Cream 1 Applicatorful Vaginal at bedtime  diphtheria/tetanus/pertussis (acellular) Vaccine (Adacel) 0.5 milliLiter(s) IntraMuscular once  heparin   Injectable 5000 Unit(s) SubCutaneous every 12 hours  ibuprofen  Tablet. 600 milliGRAM(s) Oral every 6 hours  influenza   Vaccine 0.5 milliLiter(s) IntraMuscular once  ketorolac   Injectable 30 milliGRAM(s) IV Push every 6 hours  measles/mumps/rubella Vaccine 0.5 milliLiter(s) SubCutaneous once  piperacillin/tazobactam IVPB.. 4.5 Gram(s) IV Intermittent every 8 hours    MEDICATIONS  (PRN):  diphenhydrAMINE 25 milliGRAM(s) Oral every 6 hours PRN Pruritus  lanolin Ointment 1 Application(s) Topical every 6 hours PRN Sore Nipples  magnesium hydroxide Suspension 30 milliLiter(s) Oral two times a day PRN Constipation  oxyCODONE    IR 5 milliGRAM(s) Oral every 3 hours PRN Moderate to Severe Pain (4-10)  oxyCODONE    IR 5 milliGRAM(s) Oral once PRN Moderate to Severe Pain (4-10)  simethicone 80 milliGRAM(s) Chew every 4 hours PRN Gas        Assessment and Plan  POD #1 s/p C/S. Presumed endometritis (uterine tenderness + WBC count of 19)    PP  ·	Doing well.  ·	Encourage ambulation.  ·	Incisional care and PO instructions reviewed.  ·	PP education materials provided.   ·	Breastfeeding encouraged   ·	Continue pain management.    Presumed endometritis  ·	Afebrile  ·	Zosyn ordered  ·	AM CBC ordered    Plan reviewed with Rachelle Ruiz        
Postop Day  __1_ s/p   C- Section    THERAPY:  [ xSpinal morphine   [  ] Epidural morphine   [  ] IV PCA Hydromorphone 1 mg/ml      Sedation Score:	  [ x ] Alert	    [  ] Drowsy        [  ] Arousable	[  ] Asleep	[  ] Unresponsive    Side Effects:	  [ x ] None	     [  ] Nausea        [  ] Pruritus        [  ] Weakness   [  ] Numbness        ASSESSMENT/ PLAN   [x] Gross Neurological Exam within Normal Limits  [   ] Discontinue         [  ] Continue  [X] Change to PO Pain medications as per OB team  [ x ]Documentation and Verification of current medications     Comments:  
ANESTHESIA POSTOP CHECK    23y Female POSTOP DAY 1 S/P     Vital Signs Last 24 Hrs  T(C): 36.8 (27 Feb 2024 14:26), Max: 37.3 (27 Feb 2024 10:32)  T(F): 98.2 (27 Feb 2024 14:26), Max: 99.1 (27 Feb 2024 10:32)  HR: 95 (27 Feb 2024 14:26) (71 - 102)  BP: 119/65 (27 Feb 2024 14:26) (102/75 - 151/118)  BP(mean): 79 (27 Feb 2024 03:00) (76 - 82)  RR: 17 (27 Feb 2024 14:26) (14 - 20)  SpO2: 100% (27 Feb 2024 14:26) (98% - 100%)    Parameters below as of 27 Feb 2024 14:26  Patient On (Oxygen Delivery Method): room air      I&O's Summary    26 Feb 2024 07:01  -  27 Feb 2024 07:00  --------------------------------------------------------  IN: 2100 mL / OUT: 1038 mL / NET: 1062 mL    27 Feb 2024 07:01  -  27 Feb 2024 14:48  --------------------------------------------------------  IN: 0 mL / OUT: 1200 mL / NET: -1200 mL        [ x] NO APPARENT ANESTHESIA COMPLICATIONS

## 2024-02-29 NOTE — PROVIDER CONTACT NOTE (OTHER) - ASSESSMENT
pt respiratory; bilater clear.   bp 121/61  pulse 84  oxygen 100
Lungs clear upon auscultation, pt appears in no distress while laying in bed. Pt appears conformable and talking. Light lochia noted on the pad, fundus firm, abdomen soft.  Pulse ox 99% on room air. No calf tenderness, and or edema noted. EKG done.

## 2024-02-29 NOTE — LACTATION INITIAL EVALUATION - LACTATION INTERVENTIONS
Reviewed triple feeding every 3 hours with own expressed breastmilk./initiate/review safe skin-to-skin/initiate/review hand expression/initiate/review pumping guidelines and safe milk handling/initiate/review techniques for position and latch/post discharge community resources provided/initiate/review supplementation plan due to medical indications/review techniques to increase milk supply/review techniques to manage sore nipples/engorgement/initiate/review breast massage/compression/reviewed components of an effective feeding and at least 8 effective feedings per day required/reviewed importance of monitoring infant diapers, the breastfeeding log, and minimum output each day/reviewed risks of unnecessary formula supplementation/reviewed strategies to transition to breastfeeding only/reviewed benefits and recommendations for rooming in

## 2024-02-29 NOTE — CHART NOTE - NSCHARTNOTEFT_GEN_A_CORE
Received call from nurse that patient is experiencing chest tightness. Went up to bedside to evaluate pt. Pt does not appear to be in distress, is endorsing 5/10 chest tightness when breathing. Tightness does not radiate or change with position. She is also describing a sensation of "something moving up and down" in her chest at those times with some mild shortness of breath. Has no cardiac history and has never felt symptoms before. On exam, lungs clear to auscultation bilaterally, heart sounds within normal limits. Vitals within normal limits, no tachycardia, desaturation, or change in blood pressure.    ICU Vital Signs Last 24 Hrs  T(C): 36.7 (29 Feb 2024 05:09), Max: 36.9 (28 Feb 2024 13:41)  T(F): 98 (29 Feb 2024 05:09), Max: 98.4 (28 Feb 2024 13:41)  HR: 90 (29 Feb 2024 05:09) (90 - 106)  BP: 117/70 (29 Feb 2024 05:09) (106/62 - 122/74)  BP(mean): --  ABP: --  ABP(mean): --  RR: 18 (29 Feb 2024 05:09) (16 - 18)  SpO2: 99% (29 Feb 2024 05:09) (98% - 100%)    O2 Parameters below as of 29 Feb 2024 05:09  Patient On (Oxygen Delivery Method): room air        - Ordered EKG  - Ordered Pepcid  - Will continue to monitor vitals and symptoms      Ivette Kuo PGY1

## 2024-03-11 NOTE — ED PROVIDER NOTE - CARE PLAN
Message left again that patient should make appt for annual before mammogram orders entered. Or she can contact PCP for a mammogram order.    Principal Discharge DX:	Pelvic pain   1

## 2024-03-17 ENCOUNTER — EMERGENCY (EMERGENCY)
Facility: HOSPITAL | Age: 24
LOS: 1 days | Discharge: HOME | End: 2024-03-17
Attending: STUDENT IN AN ORGANIZED HEALTH CARE EDUCATION/TRAINING PROGRAM | Admitting: STUDENT IN AN ORGANIZED HEALTH CARE EDUCATION/TRAINING PROGRAM
Payer: MEDICAID

## 2024-03-17 VITALS
OXYGEN SATURATION: 100 % | SYSTOLIC BLOOD PRESSURE: 116 MMHG | HEART RATE: 66 BPM | RESPIRATION RATE: 16 BRPM | DIASTOLIC BLOOD PRESSURE: 78 MMHG | TEMPERATURE: 98 F

## 2024-03-17 VITALS
HEIGHT: 63 IN | RESPIRATION RATE: 20 BRPM | OXYGEN SATURATION: 98 % | HEART RATE: 79 BPM | TEMPERATURE: 98 F | SYSTOLIC BLOOD PRESSURE: 112 MMHG | DIASTOLIC BLOOD PRESSURE: 75 MMHG

## 2024-03-17 DIAGNOSIS — Z98.890 OTHER SPECIFIED POSTPROCEDURAL STATES: Chronic | ICD-10-CM

## 2024-03-17 PROCEDURE — 72020 X-RAY EXAM OF SPINE 1 VIEW: CPT | Mod: 26

## 2024-03-17 PROCEDURE — 99285 EMERGENCY DEPT VISIT HI MDM: CPT

## 2024-03-17 PROCEDURE — 71046 X-RAY EXAM CHEST 2 VIEWS: CPT | Mod: 26

## 2024-03-17 PROCEDURE — 93010 ELECTROCARDIOGRAM REPORT: CPT

## 2024-03-17 RX ORDER — KETOROLAC TROMETHAMINE 30 MG/ML
15 SYRINGE (ML) INJECTION ONCE
Refills: 0 | Status: DISCONTINUED | OUTPATIENT
Start: 2024-03-17 | End: 2024-03-17

## 2024-03-17 RX ORDER — LIDOCAINE 4 G/100G
1 CREAM TOPICAL ONCE
Refills: 0 | Status: COMPLETED | OUTPATIENT
Start: 2024-03-17 | End: 2024-03-17

## 2024-03-17 RX ORDER — ONDANSETRON 8 MG/1
4 TABLET, FILM COATED ORAL ONCE
Refills: 0 | Status: COMPLETED | OUTPATIENT
Start: 2024-03-17 | End: 2024-03-17

## 2024-03-17 RX ORDER — SODIUM CHLORIDE 9 MG/ML
1000 INJECTION INTRAMUSCULAR; INTRAVENOUS; SUBCUTANEOUS ONCE
Refills: 0 | Status: COMPLETED | OUTPATIENT
Start: 2024-03-17 | End: 2024-03-17

## 2024-03-17 NOTE — ED PROVIDER NOTE - NSFOLLOWUPINSTRUCTIONS_ED_ALL_ED_FT
Rest, drink plenty of fluids.  Advance activity as tolerated.  Continue all previously prescribed medications as directed.  Follow up with your primary care physician in 48-72 hours- bring copies of your results.  Return to the ER for worsening or persistent symptoms, and/or ANY NEW OR CONCERNING SYMPTOMS. If you have issues obtaining follow up, please call: [phone] ([phone] to obtain a doctor or specialist who takes your insurance in your area.     Take Tylenol 650mg (Two 325 mg pills) every 4-6 hours as needed for pain.   Take Motrin/Ibuprofen 600 mg every 6-8 hours as needed for moderate pain -- take with food.   Apply warm compress to affected area for 15 minutes, 3-4 times per day.

## 2024-03-17 NOTE — ED ADULT NURSE NOTE - OBJECTIVE STATEMENT
Pt. presents to room 26 c/o mid back pain, chest pain nausea and mild SOB that began tonight apprx 5 PM. Pt. states the mid back pain came first as a deep throbbing pain the CP began 20 min later like somebody sitting on her chest causing her SOB. Pt. had Csection 2/26 d/t baby being breech no complications for mom during pregnancy. denies PMHx. Pt. denies any leg swelling abd pain diarrhea fever/chills dysuria. Pt. is currently breast feeding.  line and labs to be collected. XR results pending

## 2024-03-17 NOTE — ED PROVIDER NOTE - CLINICAL SUMMARY MEDICAL DECISION MAKING FREE TEXT BOX
22 yo F with no significant PMH, postpartum w/ C section 24, accompanied by , presents to ED c/o right mid back pain and midsternal chest pain today. well appearing female. The patient's risk factors for ACS were reviewed as well as the EKG. suspicion for PE given recent . Right mid back pain likely musculoskeletal pain (reproducible point tenderness, worse with movements), less likely renal colic. Plan: labs, trop, d dimer, CXR, XR thoracic spine, Ua, pain control, and likely d/c. 22 yo F with no significant PMH, postpartum w/ C section 24, accompanied by , presents to ED c/o right mid back pain and midsternal chest pain today. well appearing female. The patient's risk factors for ACS were reviewed as well as the EKG. suspicion for PE given recent , no tachycardia, no hypoxia. Right mid back pain likely musculoskeletal pain (reproducible point tenderness, worse with movements), less likely renal colic. Plan: labs, trop, d dimer, CXR, XR thoracic spine, Ua, pain control, and likely d/c.

## 2024-03-17 NOTE — ED PROVIDER NOTE - PATIENT PORTAL LINK FT
You can access the FollowMyHealth Patient Portal offered by [facility] by registering at the following website: [email or web address]. By joining [facility]’s FollowMyHealth portal, you will also be able to view your health information using other applications (apps) compatible with our system.

## 2024-03-17 NOTE — ED PROVIDER NOTE - NS CPE EDP MUSC THORACIC LOC
no midline spine tenderness, point tenderness right paraspinal tenderness, no erythema, no edema, no obvious deformity b/l.

## 2024-03-17 NOTE — ED ADULT TRIAGE NOTE - CHIEF COMPLAINT QUOTE
Pt had c- section 02/26. c/o  sharp mid back pain since 5pm today. Took Tylenol 1000mg x2 , last dose around 8:30pm which didn't help. c/o chest tightness and feels SOB and lightheaded. will obtain EKG

## 2024-03-17 NOTE — ED PROVIDER NOTE - ATTENDING APP SHARED VISIT CONTRIBUTION OF CARE
23F  s/p  on  p/w R sided back pain and chest pain. Reports similar symptoms ~1.5 weeks ago that self-resolved but today not improving even with tylenol. Describes back pain as localized to specific spot of paraspinal thoracic spine and states it is deep/penetrating and non-radiating, made worse with movement. Chest pain also started today, not improved with breast feeding/pumping. Describes as pressure-like sensation to upper chest without radiation. No exertional component, changes in exercise tolerance or orthopnea. Denies chest pain, sob, difficulty breathing, cough, fever/chills. No leg swelling or calf pain. No urinary symptoms. Denies any prior history of DVT/PE or known hypercoagulable state.   Gen: uncomfortable but non-toxic appearing  CV: rrr, no appreciable murmur  Pulm: clear lungs  Abd: soft, nd; mild tenderness to lower abd at incision site which appears to be healing well, clean, dry and intact w/ steri strips  MSK/Skin: no midline neck/spine tenderness though notes pinpoint tenderness just lateral to lower thoracic spine on right without overlying bruising/erythema and no palpable stepoffs or swelling  MDM: 23F  s/p  on  p/w R sided back pain and chest pain. Back pain appears MSK. The location is paraspinal and not associated with CVA tenderness, nor does she have and urinary symptoms to suggest UTI/pyelo or renal stones. Her cynthia exam is also benign but will get XRay t-spine to further assess. For chest pain, ACS less likely based on age, lack of risk factors, no h/o early cardiac disease. Though given recent pregnancy cardiomegaly could be potential, she is not reporting edema or sob which would be more likely associated symptoms. EKG reviewed from triage. Can send troponin and d-dimer to assess for ACS or potential PE given chest discomfort and recent surgery. Analgesia provided while awaiting results of workup.

## 2024-03-17 NOTE — ED ADULT NURSE NOTE - NSFALLUNIVINTERV_ED_ALL_ED
Bed/Stretcher in lowest position, wheels locked, appropriate side rails in place/Call bell, personal items and telephone in reach/Instruct patient to call for assistance before getting out of bed/chair/stretcher/Non-slip footwear applied when patient is off stretcher/[place] to call system/Physically safe environment - no spills, clutter or unnecessary equipment/Purposeful proactive rounding/Room/bathroom lighting operational, light cord in reach

## 2024-03-17 NOTE — ED PROVIDER NOTE - WR INTERPRETATION 2
CXR negative - No pneumothorax, No opacities, No free air. No infiltrates, No consolidation, No atelectasis seen

## 2024-03-17 NOTE — ED PROVIDER NOTE - OBJECTIVE STATEMENT
24 yo F with no significant PMH, postpartum w/ C section 2/26/24, accompanied by , presents to ED c/o right mid back pain and midsternal chest pain today. Reports there is a specific spot in the back, deep penetrating pain, 7/10, constant, non-radiating, worse with movements and ambulation. States after the back pain, start having midsternal chest pain, like pressure, 4/10, constant, non radiating, no aggravating factors, a/w palpitation, sob, lightheadedness, and nausea. Pt denies any associated symptoms, including f/c//v, near syncope, diaphoresis, cough, weakness, any recent trauma or injury to chest, lower extremity edema. Denies any prior history of DVT/PE, hx of malignancy or known hypercoagulable state. Denies any early family history of cardiac death or MI. 22 yo F with no significant PMH, postpartum w/ C section 2/26/24, accompanied by , presents to ED c/o right mid back pain and midsternal chest pain today. Reports there is a specific spot in the back, deep penetrating pain, 7/10, constant, non-radiating, worse with palpation, movements, and ambulation. States after the back pain, start having midsternal chest pain, like pressure, 4/10, constant, non radiating, no aggravating factors, a/w palpitation, sob, lightheadedness, and nausea. Pt denies any associated symptoms, including f/c//v, near syncope, diaphoresis, cough, weakness, any recent trauma or injury to chest, lower extremity edema. Denies any prior history of DVT/PE, hx of malignancy or known hypercoagulable state. Denies any early family history of cardiac death or MI.

## 2024-03-17 NOTE — ED PROVIDER NOTE - ABDOMINAL EXAM
appropriate tenderness to lower abd. incision healing well, clean, dry and intact w/ steri strips/soft

## 2024-03-18 LAB
ADD ON TEST-SPECIMEN IN LAB: SIGNIFICANT CHANGE UP
ALBUMIN SERPL ELPH-MCNC: 4.3 G/DL — SIGNIFICANT CHANGE UP (ref 3.3–5)
ALP SERPL-CCNC: 169 U/L — HIGH (ref 40–120)
ALT FLD-CCNC: 19 U/L — SIGNIFICANT CHANGE UP (ref 4–33)
ANION GAP SERPL CALC-SCNC: 11 MMOL/L — SIGNIFICANT CHANGE UP (ref 7–14)
APPEARANCE UR: CLEAR — SIGNIFICANT CHANGE UP
APTT BLD: 38.7 SEC — HIGH (ref 24.5–35.6)
AST SERPL-CCNC: 24 U/L — SIGNIFICANT CHANGE UP (ref 4–32)
BACTERIA # UR AUTO: ABNORMAL /HPF
BASOPHILS # BLD AUTO: 0.07 K/UL — SIGNIFICANT CHANGE UP (ref 0–0.2)
BASOPHILS NFR BLD AUTO: 0.5 % — SIGNIFICANT CHANGE UP (ref 0–2)
BILIRUB SERPL-MCNC: <0.2 MG/DL — SIGNIFICANT CHANGE UP (ref 0.2–1.2)
BILIRUB UR-MCNC: NEGATIVE — SIGNIFICANT CHANGE UP
BUN SERPL-MCNC: 14 MG/DL — SIGNIFICANT CHANGE UP (ref 7–23)
CALCIUM SERPL-MCNC: 9.9 MG/DL — SIGNIFICANT CHANGE UP (ref 8.4–10.5)
CHLORIDE SERPL-SCNC: 102 MMOL/L — SIGNIFICANT CHANGE UP (ref 98–107)
CO2 SERPL-SCNC: 23 MMOL/L — SIGNIFICANT CHANGE UP (ref 22–31)
COLOR SPEC: YELLOW — SIGNIFICANT CHANGE UP
CREAT SERPL-MCNC: 0.57 MG/DL — SIGNIFICANT CHANGE UP (ref 0.5–1.3)
DIFF PNL FLD: ABNORMAL
EGFR: 132 ML/MIN/1.73M2 — SIGNIFICANT CHANGE UP
EOSINOPHIL # BLD AUTO: 0.22 K/UL — SIGNIFICANT CHANGE UP (ref 0–0.5)
EOSINOPHIL NFR BLD AUTO: 1.6 % — SIGNIFICANT CHANGE UP (ref 0–6)
EPI CELLS # UR: SIGNIFICANT CHANGE UP
GLUCOSE SERPL-MCNC: 93 MG/DL — SIGNIFICANT CHANGE UP (ref 70–99)
GLUCOSE UR QL: NEGATIVE MG/DL — SIGNIFICANT CHANGE UP
HCT VFR BLD CALC: 34.1 % — LOW (ref 34.5–45)
HGB BLD-MCNC: 11.3 G/DL — LOW (ref 11.5–15.5)
IANC: 10.93 K/UL — HIGH (ref 1.8–7.4)
IMM GRANULOCYTES NFR BLD AUTO: 0.5 % — SIGNIFICANT CHANGE UP (ref 0–0.9)
INR BLD: 0.97 RATIO — SIGNIFICANT CHANGE UP (ref 0.85–1.18)
KETONES UR-MCNC: NEGATIVE MG/DL — SIGNIFICANT CHANGE UP
LEUKOCYTE ESTERASE UR-ACNC: NEGATIVE — SIGNIFICANT CHANGE UP
LIDOCAIN IGE QN: 34 U/L — SIGNIFICANT CHANGE UP (ref 7–60)
LYMPHOCYTES # BLD AUTO: 1.81 K/UL — SIGNIFICANT CHANGE UP (ref 1–3.3)
LYMPHOCYTES # BLD AUTO: 13.1 % — SIGNIFICANT CHANGE UP (ref 13–44)
MCHC RBC-ENTMCNC: 29 PG — SIGNIFICANT CHANGE UP (ref 27–34)
MCHC RBC-ENTMCNC: 33.1 GM/DL — SIGNIFICANT CHANGE UP (ref 32–36)
MCV RBC AUTO: 87.7 FL — SIGNIFICANT CHANGE UP (ref 80–100)
MONOCYTES # BLD AUTO: 0.76 K/UL — SIGNIFICANT CHANGE UP (ref 0–0.9)
MONOCYTES NFR BLD AUTO: 5.5 % — SIGNIFICANT CHANGE UP (ref 2–14)
NEUTROPHILS # BLD AUTO: 10.93 K/UL — HIGH (ref 1.8–7.4)
NEUTROPHILS NFR BLD AUTO: 78.8 % — HIGH (ref 43–77)
NITRITE UR-MCNC: NEGATIVE — SIGNIFICANT CHANGE UP
NRBC # BLD: 0 /100 WBCS — SIGNIFICANT CHANGE UP (ref 0–0)
NRBC # FLD: 0 K/UL — SIGNIFICANT CHANGE UP (ref 0–0)
PH UR: 5.5 — SIGNIFICANT CHANGE UP (ref 5–8)
PLATELET # BLD AUTO: 416 K/UL — HIGH (ref 150–400)
POTASSIUM SERPL-MCNC: 3.5 MMOL/L — SIGNIFICANT CHANGE UP (ref 3.5–5.3)
POTASSIUM SERPL-SCNC: 3.5 MMOL/L — SIGNIFICANT CHANGE UP (ref 3.5–5.3)
PROT SERPL-MCNC: 7.9 G/DL — SIGNIFICANT CHANGE UP (ref 6–8.3)
PROT UR-MCNC: SIGNIFICANT CHANGE UP MG/DL
PROTHROM AB SERPL-ACNC: 11 SEC — SIGNIFICANT CHANGE UP (ref 9.5–13)
RBC # BLD: 3.89 M/UL — SIGNIFICANT CHANGE UP (ref 3.8–5.2)
RBC # FLD: 11.9 % — SIGNIFICANT CHANGE UP (ref 10.3–14.5)
RBC CASTS # UR COMP ASSIST: SIGNIFICANT CHANGE UP /HPF (ref 0–4)
SODIUM SERPL-SCNC: 136 MMOL/L — SIGNIFICANT CHANGE UP (ref 135–145)
SP GR SPEC: 1.04 — HIGH (ref 1–1.03)
TROPONIN T, HIGH SENSITIVITY RESULT: 6 NG/L — SIGNIFICANT CHANGE UP
UROBILINOGEN FLD QL: 1 MG/DL — SIGNIFICANT CHANGE UP (ref 0.2–1)
WBC # BLD: 13.86 K/UL — HIGH (ref 3.8–10.5)
WBC # FLD AUTO: 13.86 K/UL — HIGH (ref 3.8–10.5)
WBC UR QL: SIGNIFICANT CHANGE UP /HPF (ref 0–5)

## 2024-03-18 RX ADMIN — LIDOCAINE 1 PATCH: 4 CREAM TOPICAL at 00:50

## 2024-03-18 RX ADMIN — SODIUM CHLORIDE 1000 MILLILITER(S): 9 INJECTION INTRAMUSCULAR; INTRAVENOUS; SUBCUTANEOUS at 00:10

## 2024-03-18 RX ADMIN — ONDANSETRON 4 MILLIGRAM(S): 8 TABLET, FILM COATED ORAL at 00:55

## 2024-03-18 RX ADMIN — Medication 15 MILLIGRAM(S): at 00:49

## 2024-03-18 NOTE — ED ADULT NURSE REASSESSMENT NOTE - PAIN: RESPONSE TO INTERVENTIONS
STEROID INJECTION  Procedure Note    Sada Casey  8/21/2018    Pre-op Diagnosis:   OSTEOARTHRITIS MIDFOOT - BILATERAL FEET   * Primary osteoarthritis of foot [M19.079]     * Pain, foot [M79.673]      Post-op Diagnosis:     Post-Op Diagnosis Codes:     * Primary osteoarthritis of foot [M19.079]     * Pain, foot [M79.673]    Procedure/CPT® Codes:      Procedure(s):  2, 3 TARSOMETATARSAL JOINT INJECTION WITH FLUROSCOPIC GUIDANCE - BILATERAL FEET    Surgeon(s):  Senthil Curtis DPM    Anesthesia: Local    Staff:   Circulator: Noé De Jesus RN  Scrub Person: Flavia Jaquez    Indications for procedure:  Pain secondary osteoarthritis second third tarsometatarsal joints bilaterally    Procedure details:  The patient was brought into the operating room placed under sedation.  Following procedures were performed.    Procedure #1 intra-articular injection second third tarsal metatarsal joints left foot fluoroscopic guidance.    Attention was then directed the dorsal lateral aspect patient's left foot where in usual fashion under aseptic technique utilizing 25-gauge 1-1/2 inch needle combination of 2 mL 0.5% naropin plain and 1 mL of Depo-Medrol 80 mg/ml was infiltrated in the second third tarsal metatarsal joints under fluoroscopic guidance.  Bandage was applied.      Procedure #2 intra-articular injection second third tarsal metatarsal joints right foot fluoroscopic guidance.    Attention was then directed the dorsal lateral aspect patient's right foot where in usual fashion under aseptic technique utilizing 25-gauge 1-1/2 inch needle combination of 2 mL 0.5% naropin plain and 1 mL of Depo-Medrol 80 mg/ml was infiltrated in the second third tarsal metatarsal joints under fluoroscopic guidance.  Bandage was applied.          Estimated Blood Loss: none    Specimens:                None      Drains:  None    Implants: Nothing was implanted during the procedure     Complications: none    Follow up:   4- 6  CP/partial relief josiah.    Senthil Curtis DPM     Date: 8/21/2018  Time: 8:20 AM

## 2024-03-18 NOTE — ED ADULT NURSE REASSESSMENT NOTE - NS ED NURSE REASSESS COMMENT FT1
received report from JUANY Dahl. pt A&Ox4, amb at baseline. NSR on monitor endorsing chest pain. pt labs collected and sent. to be administered IV pain meds. safety maintained awaiting orders

## 2024-03-19 LAB
CULTURE RESULTS: SIGNIFICANT CHANGE UP
SPECIMEN SOURCE: SIGNIFICANT CHANGE UP

## 2024-05-06 ENCOUNTER — APPOINTMENT (OUTPATIENT)
Dept: GASTROENTEROLOGY | Facility: CLINIC | Age: 24
End: 2024-05-06
Payer: MEDICAID

## 2024-05-06 ENCOUNTER — EMERGENCY (EMERGENCY)
Facility: HOSPITAL | Age: 24
LOS: 1 days | Discharge: HOME | End: 2024-05-06
Attending: EMERGENCY MEDICINE | Admitting: EMERGENCY MEDICINE
Payer: MEDICAID

## 2024-05-06 VITALS
TEMPERATURE: 98 F | HEART RATE: 61 BPM | DIASTOLIC BLOOD PRESSURE: 65 MMHG | OXYGEN SATURATION: 98 % | SYSTOLIC BLOOD PRESSURE: 102 MMHG | RESPIRATION RATE: 17 BRPM

## 2024-05-06 VITALS
HEART RATE: 81 BPM | RESPIRATION RATE: 18 BRPM | TEMPERATURE: 98 F | HEIGHT: 63 IN | SYSTOLIC BLOOD PRESSURE: 104 MMHG | OXYGEN SATURATION: 99 % | DIASTOLIC BLOOD PRESSURE: 69 MMHG

## 2024-05-06 VITALS
BODY MASS INDEX: 23.21 KG/M2 | HEIGHT: 63 IN | SYSTOLIC BLOOD PRESSURE: 96 MMHG | HEART RATE: 68 BPM | WEIGHT: 131 LBS | DIASTOLIC BLOOD PRESSURE: 54 MMHG | TEMPERATURE: 97.7 F | RESPIRATION RATE: 16 BRPM | OXYGEN SATURATION: 97 %

## 2024-05-06 DIAGNOSIS — K80.20 CALCULUS OF GALLBLADDER W/OUT CHOLECYSTITIS W/OUT OBSTRUCTION: ICD-10-CM

## 2024-05-06 DIAGNOSIS — R10.9 UNSPECIFIED ABDOMINAL PAIN: ICD-10-CM

## 2024-05-06 DIAGNOSIS — R10.11 RIGHT UPPER QUADRANT PAIN: ICD-10-CM

## 2024-05-06 DIAGNOSIS — Z98.890 OTHER SPECIFIED POSTPROCEDURAL STATES: Chronic | ICD-10-CM

## 2024-05-06 LAB
ALBUMIN SERPL ELPH-MCNC: 4.5 G/DL — SIGNIFICANT CHANGE UP (ref 3.3–5)
ALP SERPL-CCNC: 116 U/L — SIGNIFICANT CHANGE UP (ref 40–120)
ALT FLD-CCNC: 19 U/L — SIGNIFICANT CHANGE UP (ref 4–33)
ANION GAP SERPL CALC-SCNC: 14 MMOL/L — SIGNIFICANT CHANGE UP (ref 7–14)
APTT BLD: 37.6 SEC — HIGH (ref 24.5–35.6)
AST SERPL-CCNC: 16 U/L — SIGNIFICANT CHANGE UP (ref 4–32)
BASOPHILS # BLD AUTO: 0.06 K/UL — SIGNIFICANT CHANGE UP (ref 0–0.2)
BASOPHILS NFR BLD AUTO: 0.9 % — SIGNIFICANT CHANGE UP (ref 0–2)
BILIRUB SERPL-MCNC: 0.3 MG/DL — SIGNIFICANT CHANGE UP (ref 0.2–1.2)
BLD GP AB SCN SERPL QL: NEGATIVE — SIGNIFICANT CHANGE UP
BUN SERPL-MCNC: 12 MG/DL — SIGNIFICANT CHANGE UP (ref 7–23)
CALCIUM SERPL-MCNC: 8.9 MG/DL — SIGNIFICANT CHANGE UP (ref 8.4–10.5)
CHLORIDE SERPL-SCNC: 103 MMOL/L — SIGNIFICANT CHANGE UP (ref 98–107)
CO2 SERPL-SCNC: 22 MMOL/L — SIGNIFICANT CHANGE UP (ref 22–31)
CREAT SERPL-MCNC: 0.58 MG/DL — SIGNIFICANT CHANGE UP (ref 0.5–1.3)
EGFR: 131 ML/MIN/1.73M2 — SIGNIFICANT CHANGE UP
EOSINOPHIL # BLD AUTO: 0.22 K/UL — SIGNIFICANT CHANGE UP (ref 0–0.5)
EOSINOPHIL NFR BLD AUTO: 3.1 % — SIGNIFICANT CHANGE UP (ref 0–6)
GLUCOSE SERPL-MCNC: 96 MG/DL — SIGNIFICANT CHANGE UP (ref 70–99)
HCT VFR BLD CALC: 33.6 % — LOW (ref 34.5–45)
HGB BLD-MCNC: 11.6 G/DL — SIGNIFICANT CHANGE UP (ref 11.5–15.5)
IANC: 3.5 K/UL — SIGNIFICANT CHANGE UP (ref 1.8–7.4)
IMM GRANULOCYTES NFR BLD AUTO: 0.1 % — SIGNIFICANT CHANGE UP (ref 0–0.9)
INR BLD: 1.03 RATIO — SIGNIFICANT CHANGE UP (ref 0.85–1.18)
LIDOCAIN IGE QN: 30 U/L — SIGNIFICANT CHANGE UP (ref 7–60)
LYMPHOCYTES # BLD AUTO: 2.67 K/UL — SIGNIFICANT CHANGE UP (ref 1–3.3)
LYMPHOCYTES # BLD AUTO: 38 % — SIGNIFICANT CHANGE UP (ref 13–44)
MCHC RBC-ENTMCNC: 28.8 PG — SIGNIFICANT CHANGE UP (ref 27–34)
MCHC RBC-ENTMCNC: 34.5 GM/DL — SIGNIFICANT CHANGE UP (ref 32–36)
MCV RBC AUTO: 83.4 FL — SIGNIFICANT CHANGE UP (ref 80–100)
MONOCYTES # BLD AUTO: 0.57 K/UL — SIGNIFICANT CHANGE UP (ref 0–0.9)
MONOCYTES NFR BLD AUTO: 8.1 % — SIGNIFICANT CHANGE UP (ref 2–14)
NEUTROPHILS # BLD AUTO: 3.5 K/UL — SIGNIFICANT CHANGE UP (ref 1.8–7.4)
NEUTROPHILS NFR BLD AUTO: 49.8 % — SIGNIFICANT CHANGE UP (ref 43–77)
NRBC # BLD: 0 /100 WBCS — SIGNIFICANT CHANGE UP (ref 0–0)
NRBC # FLD: 0 K/UL — SIGNIFICANT CHANGE UP (ref 0–0)
PLATELET # BLD AUTO: 308 K/UL — SIGNIFICANT CHANGE UP (ref 150–400)
POTASSIUM SERPL-MCNC: 3.9 MMOL/L — SIGNIFICANT CHANGE UP (ref 3.5–5.3)
POTASSIUM SERPL-SCNC: 3.9 MMOL/L — SIGNIFICANT CHANGE UP (ref 3.5–5.3)
PROT SERPL-MCNC: 7.8 G/DL — SIGNIFICANT CHANGE UP (ref 6–8.3)
PROTHROM AB SERPL-ACNC: 11.6 SEC — SIGNIFICANT CHANGE UP (ref 9.5–13)
RBC # BLD: 4.03 M/UL — SIGNIFICANT CHANGE UP (ref 3.8–5.2)
RBC # FLD: 13.1 % — SIGNIFICANT CHANGE UP (ref 10.3–14.5)
RH IG SCN BLD-IMP: POSITIVE — SIGNIFICANT CHANGE UP
SODIUM SERPL-SCNC: 139 MMOL/L — SIGNIFICANT CHANGE UP (ref 135–145)
WBC # BLD: 7.03 K/UL — SIGNIFICANT CHANGE UP (ref 3.8–10.5)
WBC # FLD AUTO: 7.03 K/UL — SIGNIFICANT CHANGE UP (ref 3.8–10.5)

## 2024-05-06 PROCEDURE — 99204 OFFICE O/P NEW MOD 45 MIN: CPT

## 2024-05-06 PROCEDURE — 99284 EMERGENCY DEPT VISIT MOD MDM: CPT

## 2024-05-06 PROCEDURE — 76705 ECHO EXAM OF ABDOMEN: CPT | Mod: 26

## 2024-05-06 RX ORDER — SODIUM CHLORIDE 9 MG/ML
1000 INJECTION INTRAMUSCULAR; INTRAVENOUS; SUBCUTANEOUS ONCE
Refills: 0 | Status: COMPLETED | OUTPATIENT
Start: 2024-05-06 | End: 2024-05-06

## 2024-05-06 RX ORDER — ACETAMINOPHEN 325 MG/1
TABLET, FILM COATED ORAL
Refills: 0 | Status: ACTIVE | COMMUNITY

## 2024-05-06 RX ADMIN — SODIUM CHLORIDE 1000 MILLILITER(S): 9 INJECTION INTRAMUSCULAR; INTRAVENOUS; SUBCUTANEOUS at 20:36

## 2024-05-06 NOTE — ED PROVIDER NOTE - CLINICAL SUMMARY MEDICAL DECISION MAKING FREE TEXT BOX
Patient presents emergency department with upper quadrant pain with cholelithiasis no signs of cholecystitis.  Negative Da Silva sign right middle abdominal pain with negative CT imaging recently.Hemodynamically stable vital stable.  To follow-up with GI for surgery for outpatient cholecystectomy.

## 2024-05-06 NOTE — ED ADULT NURSE REASSESSMENT NOTE - NS ED NURSE REASSESS COMMENT FT1
Patient received in results waiting from intake. Patient is Aox4, ambulatory, family at bedside, in no signs of acute distress. Patient waiting ultrasound results. Patient resting comfortably on stretcher. Comfort measures maintained. Bed at lowest position. Safety Maintained.

## 2024-05-06 NOTE — ED ADULT NURSE NOTE - OBJECTIVE STATEMENT
Patient received in intake. A&O4. AMbylatory. Past medical history of c section 2/26. Came into ED with complaints of abdominal pain x few months. States being seen at doctor with CT showing possible gallstones. Endorsing episodes of Nausea and vomiting. Respirations even and unlabored. Labs drawn and sent. Call bell within reach. Pending labR and US. Comfort and safety maintianted. Family at bedside.

## 2024-05-06 NOTE — ED ADULT TRIAGE NOTE - CHIEF COMPLAINT QUOTE
Pt. c/o RUQ pain, chest tightness, sob and nausea since having a  on . CT last week showed gallbladder stone. Sent to be evaluated by Dr. Arellano for possible surgery. Denies vomiting or fever.

## 2024-05-06 NOTE — ED PROVIDER NOTE - PATIENT PORTAL LINK FT
You can access the FollowMyHealth Patient Portal offered by [facility], [place] by registering at the following website: [email or web address]. By joining [employer]’s FollowMyHealth portal, you will also be able to view your health information using other applications (apps) compatible with our system.

## 2024-05-06 NOTE — ED PROVIDER NOTE - NSFOLLOWUPINSTRUCTIONS_ED_ALL_ED_FT
You were seen in our department for Abdominal pain  Follow up with your PMD in 48-72 hours for further monitoring.  Follow up with General Surgery within 1 week for further evaluation.  if you develop any chest pain, dizziness, high fevers, weakness, numbness, tingling, vision changes, or any worsening symptoms return to our ED for evaluation.

## 2024-05-06 NOTE — ED PROVIDER NOTE - PROGRESS NOTE DETAILS
Patient presents emergency department with epigastric right upper quadrant pain negative for cholelithiasis and no negative cholecystitis.Pending reeval and outpatient follow-up with surgery. BEATRIZ Angelo: Patient seen resting comfortably and NAD. Patient reports improvement of symptoms. Ultrasound showed Cholelithiasis with no sign of cholecystitis. Labs/imaging discussed and reviewed with patient. Patient HDS, ambulating well and tolerating PO. Return precautions given upon discharge.

## 2024-05-06 NOTE — ED PROVIDER NOTE - GASTROINTESTINAL, MLM
Evelyn Pino received a Vitamin B12 injection 1,000 mcg # 14 of current series given by me in the left arm IM and tolerated without complications. Next injection to be given in 28 days (2/27/24).  Negar Goldman CMA     Abdomen soft, +ruq pain, r mid abd paintender, no guarding.

## 2024-05-06 NOTE — ED PROVIDER NOTE - OBJECTIVE STATEMENT
23-year-old female history of cholelithiasis  on  went to emergency room right upper quadrant pain upper abdominal pain.  Denies any nausea vomiting.  Does admit to right middle abdominal pain with no fever here no chills.  Hemodynamically stable otherwise well-appearing has tenderness only palpation of right middle abdomen.  No active bleeding  scar well-appearing

## 2024-09-11 NOTE — OB PROVIDER TRIAGE NOTE - NSICDXPASTMEDICALHX_GEN_ALL_CORE_FT
Botox injections for Chronic Migraine           Headache log data:  See scanned data      The patient has had significant improvement in migraine intensity and frequency. Will benefit from continued injections.         Once again, a written consent was obtained from the patient to receive repeat injections of Botulinum toxin type A into muscles of the scalp, face, and neck in standardized fashion according to recommended by [employer] and widely-accepted FDA approve PREEMT trial protocol. The procedure is performed in compliance with clean technique.     155 Units of Botox injected using 30 G needle and 1 ml syringes into 31 sites as follows (45 units of 200 unit vial used are lost in dilution and transition, or discarded):     5 units into each  muscle and into the procerus muscle (15 units total)     5 units each x 2 sites into each frontalis muscle (20 units total)     5 units each x 4 sites into each temporalis muscle (40 units total)     5 units each x 3 sites into each occipitalis muscle (30 units total)     5 units each x 2 sites into each splenius capitis (20 units total)     5 units each x 3 sites into each trapezius muscle (30 units total)     Patient tolerated injections well, without any immediate side effects or complications. Patient was instructed to monitor for potential late side effects from Botox treatment, which were explained to the patient in details. Patient is instructed to call if any unusual feeling develops at the sites of injections, if there is an unexpected muscle weakness, ptosis, or any difficulty with breathing. Otherwise, patient will be brought back for the next treatment (per protocol) in 3 months.   PAST MEDICAL HISTORY:  No pertinent past medical history

## 2025-01-25 ENCOUNTER — NON-APPOINTMENT (OUTPATIENT)
Age: 25
End: 2025-01-25

## 2025-02-11 NOTE — ED PROVIDER NOTE - NSFOLLOWUPINSTRUCTIONS_ED_ALL_ED_FT
In setting of missed dialysis, missed once last week due to GI symptoms and this week on Thursday due to bad weather  Unable to go to HD today due to fall  Evidence of fluid overload with lower extremity swelling, abdominal distention on admission however that is now improved.   Chest x-ray small right pleural effusion  2D echo 10/24-EF 60 to 65%, normal wall motion, grade 1 diastolic dysfunction.  PA pressure 30 mmHg  Nephrology following  Continue HD as per nephrology  Fluid restriction  Monitor intake output  He is euvolemic on exam today on discharge.   Please follow up with GYN clinic or in Emergency Department in 48 hours for repeat blood work and imaging    Return to the ER for worsening or persistent symptoms, including but not limited to worsening/persistent pain, shortness of breath, fevers, vomiting, lightheadedness, passing out and/or ANY NEW OR CONCERNING SYMPTOMS. If you have issues obtaining follow up, please call: [phone] to obtain a doctor or specialist who takes your insurance in your area.  You may call [phone] to make an appointment with the internal medicine clinic.

## (undated) DEVICE — DRSG DERMABOND PRINEO 22CM